# Patient Record
Sex: FEMALE | Race: WHITE | NOT HISPANIC OR LATINO | Employment: OTHER | URBAN - METROPOLITAN AREA
[De-identification: names, ages, dates, MRNs, and addresses within clinical notes are randomized per-mention and may not be internally consistent; named-entity substitution may affect disease eponyms.]

---

## 2023-03-08 PROBLEM — M65.331 TRIGGER MIDDLE FINGER OF RIGHT HAND: Status: ACTIVE | Noted: 2023-03-08

## 2023-03-08 PROBLEM — R55 VASOVAGAL SYNCOPE: Status: ACTIVE | Noted: 2023-03-08

## 2023-03-08 PROBLEM — I20.9 ANGINA PECTORIS (CMS-HCC): Status: ACTIVE | Noted: 2023-03-08

## 2023-03-08 PROBLEM — R05.9 COUGH: Status: ACTIVE | Noted: 2023-03-08

## 2023-03-08 PROBLEM — R42 DIZZINESS: Status: ACTIVE | Noted: 2023-03-08

## 2023-03-08 PROBLEM — I87.2 VENOUS INSUFFICIENCY OF BOTH LOWER EXTREMITIES: Status: ACTIVE | Noted: 2023-03-08

## 2023-03-08 PROBLEM — R26.9 ABNORMAL GAIT: Status: ACTIVE | Noted: 2023-03-08

## 2023-03-08 PROBLEM — E78.5 HYPERLIPIDEMIA: Status: ACTIVE | Noted: 2023-03-08

## 2023-03-08 PROBLEM — K21.9 GASTROESOPHAGEAL REFLUX DISEASE: Status: ACTIVE | Noted: 2023-03-08

## 2023-03-08 PROBLEM — S63.509A WRIST SPRAIN: Status: ACTIVE | Noted: 2023-03-08

## 2023-03-08 PROBLEM — M85.80 OSTEOPENIA: Status: ACTIVE | Noted: 2023-03-08

## 2023-03-08 PROBLEM — K59.00 CONSTIPATION: Status: ACTIVE | Noted: 2023-03-08

## 2023-03-08 PROBLEM — S76.211D: Status: ACTIVE | Noted: 2023-03-08

## 2023-03-08 PROBLEM — R00.1 SINUS BRADYCARDIA: Status: ACTIVE | Noted: 2023-03-08

## 2023-03-08 PROBLEM — R25.2 SPASM: Status: ACTIVE | Noted: 2023-03-08

## 2023-03-08 PROBLEM — I10 BENIGN ESSENTIAL HYPERTENSION: Status: ACTIVE | Noted: 2023-03-08

## 2023-03-08 PROBLEM — F41.0 PANIC DISORDER WITHOUT AGORAPHOBIA: Status: ACTIVE | Noted: 2023-03-08

## 2023-03-08 PROBLEM — F41.8 ANTICIPATORY ANXIETY: Status: ACTIVE | Noted: 2023-03-08

## 2023-03-08 PROBLEM — R49.0 HOARSENESS OF VOICE: Status: ACTIVE | Noted: 2023-03-08

## 2023-03-08 PROBLEM — M19.041 OSTEOARTHRITIS OF FINGER, RIGHT: Status: ACTIVE | Noted: 2023-03-08

## 2023-03-08 RX ORDER — ESCITALOPRAM OXALATE 10 MG/1
1 TABLET ORAL DAILY
COMMUNITY
Start: 2017-09-26

## 2023-03-08 RX ORDER — ACETAMINOPHEN 500 MG
1 TABLET ORAL DAILY
COMMUNITY
Start: 2016-05-27

## 2023-03-08 RX ORDER — ASPIRIN 81 MG/1
1 TABLET ORAL DAILY
COMMUNITY
Start: 2017-05-30 | End: 2023-03-09 | Stop reason: ALTCHOICE

## 2023-03-08 RX ORDER — ATORVASTATIN CALCIUM 20 MG/1
TABLET, FILM COATED ORAL
COMMUNITY
End: 2023-09-20 | Stop reason: SDUPTHER

## 2023-03-08 RX ORDER — AMLODIPINE BESYLATE 10 MG/1
1 TABLET ORAL DAILY
COMMUNITY
Start: 2014-11-11 | End: 2023-05-23 | Stop reason: SDUPTHER

## 2023-03-08 RX ORDER — TRIAMCINOLONE ACETONIDE 1 MG/G
CREAM TOPICAL
COMMUNITY
Start: 2016-06-24 | End: 2024-01-08 | Stop reason: ALTCHOICE

## 2023-03-08 RX ORDER — PNV NO.95/FERROUS FUM/FOLIC AC 28MG-0.8MG
TABLET ORAL
COMMUNITY

## 2023-03-09 ENCOUNTER — OFFICE VISIT (OUTPATIENT)
Dept: PRIMARY CARE | Facility: CLINIC | Age: 78
End: 2023-03-09
Payer: MEDICARE

## 2023-03-09 VITALS
HEART RATE: 73 BPM | BODY MASS INDEX: 21.34 KG/M2 | DIASTOLIC BLOOD PRESSURE: 80 MMHG | SYSTOLIC BLOOD PRESSURE: 126 MMHG | WEIGHT: 113 LBS | OXYGEN SATURATION: 98 % | HEIGHT: 61 IN

## 2023-03-09 DIAGNOSIS — I10 BENIGN ESSENTIAL HYPERTENSION: Primary | ICD-10-CM

## 2023-03-09 DIAGNOSIS — E04.1 THYROID NODULE: ICD-10-CM

## 2023-03-09 DIAGNOSIS — M79.2 NEURALGIA: ICD-10-CM

## 2023-03-09 DIAGNOSIS — R00.2 PALPITATION: ICD-10-CM

## 2023-03-09 DIAGNOSIS — F41.8 ANTICIPATORY ANXIETY: ICD-10-CM

## 2023-03-09 PROBLEM — S76.211D: Status: RESOLVED | Noted: 2023-03-08 | Resolved: 2023-03-09

## 2023-03-09 PROBLEM — E78.5 HYPERLIPIDEMIA: Status: RESOLVED | Noted: 2023-03-08 | Resolved: 2023-03-09

## 2023-03-09 PROBLEM — K59.00 CONSTIPATION: Status: RESOLVED | Noted: 2023-03-08 | Resolved: 2023-03-09

## 2023-03-09 PROBLEM — R42 DIZZINESS: Status: RESOLVED | Noted: 2023-03-08 | Resolved: 2023-03-09

## 2023-03-09 PROBLEM — M65.331 TRIGGER MIDDLE FINGER OF RIGHT HAND: Status: RESOLVED | Noted: 2023-03-08 | Resolved: 2023-03-09

## 2023-03-09 PROBLEM — R55 VASOVAGAL SYNCOPE: Status: RESOLVED | Noted: 2023-03-08 | Resolved: 2023-03-09

## 2023-03-09 PROBLEM — R49.0 HOARSENESS OF VOICE: Status: RESOLVED | Noted: 2023-03-08 | Resolved: 2023-03-09

## 2023-03-09 PROBLEM — R26.9 ABNORMAL GAIT: Status: RESOLVED | Noted: 2023-03-08 | Resolved: 2023-03-09

## 2023-03-09 PROBLEM — I87.2 VENOUS INSUFFICIENCY OF BOTH LOWER EXTREMITIES: Status: RESOLVED | Noted: 2023-03-08 | Resolved: 2023-03-09

## 2023-03-09 PROBLEM — R25.2 SPASM: Status: RESOLVED | Noted: 2023-03-08 | Resolved: 2023-03-09

## 2023-03-09 PROBLEM — M19.041 OSTEOARTHRITIS OF FINGER, RIGHT: Status: RESOLVED | Noted: 2023-03-08 | Resolved: 2023-03-09

## 2023-03-09 PROBLEM — I20.9 ANGINA PECTORIS (CMS-HCC): Status: RESOLVED | Noted: 2023-03-08 | Resolved: 2023-03-09

## 2023-03-09 PROBLEM — R05.9 COUGH: Status: RESOLVED | Noted: 2023-03-08 | Resolved: 2023-03-09

## 2023-03-09 PROBLEM — S63.509A WRIST SPRAIN: Status: RESOLVED | Noted: 2023-03-08 | Resolved: 2023-03-09

## 2023-03-09 PROCEDURE — 99214 OFFICE O/P EST MOD 30 MIN: CPT | Performed by: INTERNAL MEDICINE

## 2023-03-09 PROCEDURE — 1036F TOBACCO NON-USER: CPT | Performed by: INTERNAL MEDICINE

## 2023-03-09 PROCEDURE — 1160F RVW MEDS BY RX/DR IN RCRD: CPT | Performed by: INTERNAL MEDICINE

## 2023-03-09 PROCEDURE — 1159F MED LIST DOCD IN RCRD: CPT | Performed by: INTERNAL MEDICINE

## 2023-03-09 PROCEDURE — 3074F SYST BP LT 130 MM HG: CPT | Performed by: INTERNAL MEDICINE

## 2023-03-09 PROCEDURE — 3079F DIAST BP 80-89 MM HG: CPT | Performed by: INTERNAL MEDICINE

## 2023-03-09 ASSESSMENT — ENCOUNTER SYMPTOMS
DIZZINESS: 0
WEAKNESS: 0
EYE ITCHING: 0
EYE DISCHARGE: 0
TREMORS: 0
FACIAL ASYMMETRY: 0
SPEECH DIFFICULTY: 0
PALPITATIONS: 1
NUMBNESS: 1
EYE REDNESS: 0

## 2023-03-09 ASSESSMENT — PAIN SCALES - GENERAL: PAINLEVEL: 0-NO PAIN

## 2023-03-09 NOTE — PROGRESS NOTES
Subjective   Patient ID: Aneta Byers is a 77 y.o. female who presents multitude of concerns  For numbness and tingling in her face now for several years but feels it is becoming more frequent seems to be more from her ear perhaps behind her ear into the right side of her face and into her chin there is no rhyme or reason why this happens but has been happening almost on a daily basis it may last about 30 minutes at a time she has not noted any motor dysfunction any issues with speech any issues with vision sometimes she feels that if she is leaning on her left elbow the right side of her face becomes numb and tingly  Is never wakes her up at nighttime but  thinks it may be happening more at nighttime when she first lays down  She recently had knee replacement now laying on her back used to lay on her stomach  She is also concerned that she has episodes where she feels that her heart is beating fast she has had some evaluation in the past and has been felt that this was more panic this does not happen very often and may be a few times a year perhaps 30 minutes at a time does not have chest pain excessive shortness of breath is never happens with exercise and she usually exercises very frequently  She is concerned about her thyroid a thyroid nodule was found incidentally when she had CT scan of her neck after a fall she is seen endocrinologist and was given the option of follow-up in 6 months or biopsy she opted for follow-up but is a little concerned  She feels her groin on the right side may be a little more swollen since she had her knee replacement now about 2 months ago  She is doing quite well with the knee replacement  It has been a difficult week for her her sister  very suddenly last week had not been well they think it was probably an MI she was 81 Aneta is concerned that she  suddenly and want to make sure there is nothing further she should do at this time they are not doing an  autopsy        Review of Systems   HENT:  Positive for hearing loss.    Eyes:  Negative for discharge, redness, itching and visual disturbance.   Cardiovascular:  Positive for palpitations and leg swelling. Negative for chest pain.   Neurological:  Positive for numbness. Negative for dizziness, tremors, facial asymmetry, speech difficulty and weakness.       Objective   Physical examination reveals a well-developed woman younger than age in no acute distress  HEENT exam is entirely unremarkable TMs are normal cranial nerves II through XII are intact without any deficits noted there is no pain to palpation  There is a very small lymph node palpable in the anterior right cervical chain thyroid is within normal limits  Cardiovascular regular rate and rhythm I detect no murmurs rubs or gallops  There are no carotid bruits noted  The lungs are clear to auscultation and percussion  Periphery right lower extremity with trace to 1+ edema  Scar well-healing from recent knee replacement  Examination of the groin unremarkable I do not detect any asymmetry  No evidence of hernia is present    Assessment/Plan   Problem List Items Addressed This Visit          Circulatory    Benign essential hypertension - Primary       Other    Anticipatory anxiety     Assessment and plan  1.  Hypertension under adequate control continue current regimen  2.  Palpitations very rare has had some work-up in the past felt to be secondary to anxiety and  does believe it is more at times of anxiety at this point we will continue to monitor if she would have more frequent episodes become more bothersome consider Zio patch  3.  Facial numbness tingling intermittent in nature no motor deficits noted and she has not noted any motor dysfunction either sounds more like a neuralgia not debilitating just somewhat bothersome at this point I do not feel we need to do anything further unless her symptoms would worsen at which point consider imaging or  neurology follow-up we discussed medications like gabapentin perhaps an occipital neuralgia as well has not had any new glasses as I thought perhaps impingement from her glasses  4.  Peripheral edema right lower extremity slightly more than left secondary to her knee replacement I do not detect any issues in her groin  5.  Thyroid nodule following with endocrinology think it is fine to do the 6-month follow-up as she is planning  6.  Anxiety she does have a psychologist she follows with she takes very rare lorazepam continue current regimen is not request refills on any of her medications today  7.  Hypercalcemia she is following also with endocrinology unfortunately I cannot access all those records planning to have bone density as well  #8 grief recent loss of sister she was 81 no major health issues did have COVID couple of weeks prior at this point I do not feel we need to do anything else in work-up for Aneta  Greater than 35 minutes was spent with this patient of which more than 50% was counseling and coordination of care

## 2023-03-15 DIAGNOSIS — Z78.0 ASYMPTOMATIC MENOPAUSAL STATE: ICD-10-CM

## 2023-03-21 ENCOUNTER — TELEPHONE (OUTPATIENT)
Dept: PRIMARY CARE | Facility: CLINIC | Age: 78
End: 2023-03-21
Payer: MEDICARE

## 2023-03-21 NOTE — TELEPHONE ENCOUNTER
----- Message from Zakiya Khan MD sent at 3/20/2023  4:12 PM EDT -----  Please let her know her bone density shows osteopenia which is thinning of the bone it does not meet criteria for any treatment other than  be sure to get adequate calcium and vitamin D in diet as well as weightbearing exercise and we will plan to recheck this in about 2 years

## 2023-04-14 DIAGNOSIS — Z00.00 ADULT GENERAL MEDICAL EXAM: ICD-10-CM

## 2023-05-02 ENCOUNTER — LAB (OUTPATIENT)
Dept: LAB | Facility: LAB | Age: 78
End: 2023-05-02
Payer: MEDICARE

## 2023-05-02 DIAGNOSIS — Z00.00 ADULT GENERAL MEDICAL EXAM: ICD-10-CM

## 2023-05-02 LAB
ALANINE AMINOTRANSFERASE (SGPT) (U/L) IN SER/PLAS: 23 U/L (ref 7–45)
ALBUMIN (G/DL) IN SER/PLAS: 4.7 G/DL (ref 3.4–5)
ALKALINE PHOSPHATASE (U/L) IN SER/PLAS: 111 U/L (ref 33–136)
ANION GAP IN SER/PLAS: 12 MMOL/L (ref 10–20)
ASPARTATE AMINOTRANSFERASE (SGOT) (U/L) IN SER/PLAS: 21 U/L (ref 9–39)
BASOPHILS (10*3/UL) IN BLOOD BY AUTOMATED COUNT: 0.02 X10E9/L (ref 0–0.1)
BASOPHILS/100 LEUKOCYTES IN BLOOD BY AUTOMATED COUNT: 0.6 % (ref 0–2)
BILIRUBIN TOTAL (MG/DL) IN SER/PLAS: 1 MG/DL (ref 0–1.2)
C REACTIVE PROTEIN (MG/L) IN SER/PLAS BY HIGH SENSIT: 0.2 MG/L
CALCIDIOL (25 OH VITAMIN D3) (NG/ML) IN SER/PLAS: 49 NG/ML
CALCIUM (MG/DL) IN SER/PLAS: 10.9 MG/DL (ref 8.6–10.6)
CARBON DIOXIDE, TOTAL (MMOL/L) IN SER/PLAS: 32 MMOL/L (ref 21–32)
CHLORIDE (MMOL/L) IN SER/PLAS: 101 MMOL/L (ref 98–107)
CHOLESTEROL (MG/DL) IN SER/PLAS: 202 MG/DL (ref 0–199)
CHOLESTEROL IN HDL (MG/DL) IN SER/PLAS: 96.3 MG/DL
CHOLESTEROL/HDL RATIO: 2.1
CREATININE (MG/DL) IN SER/PLAS: 0.64 MG/DL (ref 0.5–1.05)
EOSINOPHILS (10*3/UL) IN BLOOD BY AUTOMATED COUNT: 0.09 X10E9/L (ref 0–0.4)
EOSINOPHILS/100 LEUKOCYTES IN BLOOD BY AUTOMATED COUNT: 2.6 % (ref 0–6)
ERYTHROCYTE DISTRIBUTION WIDTH (RATIO) BY AUTOMATED COUNT: 11.6 % (ref 11.5–14.5)
ERYTHROCYTE MEAN CORPUSCULAR HEMOGLOBIN CONCENTRATION (G/DL) BY AUTOMATED: 32.9 G/DL (ref 32–36)
ERYTHROCYTE MEAN CORPUSCULAR VOLUME (FL) BY AUTOMATED COUNT: 95 FL (ref 80–100)
ERYTHROCYTES (10*6/UL) IN BLOOD BY AUTOMATED COUNT: 4.58 X10E12/L (ref 4–5.2)
ESTIMATED AVERAGE GLUCOSE FOR HBA1C: 120 MG/DL
GFR FEMALE: 90 ML/MIN/1.73M2
GLUCOSE (MG/DL) IN SER/PLAS: 104 MG/DL (ref 74–99)
HEMATOCRIT (%) IN BLOOD BY AUTOMATED COUNT: 43.5 % (ref 36–46)
HEMOGLOBIN (G/DL) IN BLOOD: 14.3 G/DL (ref 12–16)
HEMOGLOBIN A1C/HEMOGLOBIN TOTAL IN BLOOD: 5.8 %
IMMATURE GRANULOCYTES/100 LEUKOCYTES IN BLOOD BY AUTOMATED COUNT: 0 % (ref 0–0.9)
LDL: 92 MG/DL (ref 0–99)
LEUKOCYTES (10*3/UL) IN BLOOD BY AUTOMATED COUNT: 3.5 X10E9/L (ref 4.4–11.3)
LYMPHOCYTES (10*3/UL) IN BLOOD BY AUTOMATED COUNT: 1.2 X10E9/L (ref 0.8–3)
LYMPHOCYTES/100 LEUKOCYTES IN BLOOD BY AUTOMATED COUNT: 34.6 % (ref 13–44)
MONOCYTES (10*3/UL) IN BLOOD BY AUTOMATED COUNT: 0.37 X10E9/L (ref 0.05–0.8)
MONOCYTES/100 LEUKOCYTES IN BLOOD BY AUTOMATED COUNT: 10.7 % (ref 2–10)
MUCUS, URINE: NORMAL /LPF
NEUTROPHILS (10*3/UL) IN BLOOD BY AUTOMATED COUNT: 1.79 X10E9/L (ref 1.6–5.5)
NEUTROPHILS/100 LEUKOCYTES IN BLOOD BY AUTOMATED COUNT: 51.5 % (ref 40–80)
NRBC (PER 100 WBCS) BY AUTOMATED COUNT: 0 /100 WBC (ref 0–0)
PLATELETS (10*3/UL) IN BLOOD AUTOMATED COUNT: 166 X10E9/L (ref 150–450)
POTASSIUM (MMOL/L) IN SER/PLAS: 4.4 MMOL/L (ref 3.5–5.3)
PROTEIN TOTAL: 7.2 G/DL (ref 6.4–8.2)
RBC, URINE: <1 /HPF (ref 0–5)
SODIUM (MMOL/L) IN SER/PLAS: 141 MMOL/L (ref 136–145)
SQUAMOUS EPITHELIAL CELLS, URINE: <1 /HPF
THYROTROPIN (MIU/L) IN SER/PLAS BY DETECTION LIMIT <= 0.05 MIU/L: 2.87 MIU/L (ref 0.44–3.98)
TRIGLYCERIDE (MG/DL) IN SER/PLAS: 68 MG/DL (ref 0–149)
UREA NITROGEN (MG/DL) IN SER/PLAS: 19 MG/DL (ref 6–23)
VLDL: 14 MG/DL (ref 0–40)
WBC, URINE: 1 /HPF (ref 0–5)

## 2023-05-02 PROCEDURE — 83036 HEMOGLOBIN GLYCOSYLATED A1C: CPT

## 2023-05-02 PROCEDURE — 36415 COLL VENOUS BLD VENIPUNCTURE: CPT

## 2023-05-02 PROCEDURE — 86141 C-REACTIVE PROTEIN HS: CPT

## 2023-05-02 PROCEDURE — 80053 COMPREHEN METABOLIC PANEL: CPT

## 2023-05-02 PROCEDURE — 81001 URINALYSIS AUTO W/SCOPE: CPT

## 2023-05-02 PROCEDURE — 85025 COMPLETE CBC W/AUTO DIFF WBC: CPT

## 2023-05-02 PROCEDURE — 80061 LIPID PANEL: CPT

## 2023-05-02 PROCEDURE — 82306 VITAMIN D 25 HYDROXY: CPT

## 2023-05-02 PROCEDURE — 84443 ASSAY THYROID STIM HORMONE: CPT

## 2023-05-03 PROBLEM — E04.2 MULTIPLE THYROID NODULES: Status: ACTIVE | Noted: 2022-12-01

## 2023-05-03 PROBLEM — F41.0 GENERALIZED ANXIETY DISORDER WITH PANIC ATTACKS: Status: ACTIVE | Noted: 2018-02-02

## 2023-05-03 PROBLEM — I10 ESSENTIAL HYPERTENSION, BENIGN: Status: ACTIVE | Noted: 2023-05-03

## 2023-05-03 PROBLEM — K59.09 CHRONIC CONSTIPATION: Status: ACTIVE | Noted: 2018-02-02

## 2023-05-03 PROBLEM — E83.52 HYPERCALCEMIA: Status: ACTIVE | Noted: 2023-02-10

## 2023-05-03 PROBLEM — F41.1 GENERALIZED ANXIETY DISORDER WITH PANIC ATTACKS: Status: ACTIVE | Noted: 2018-02-02

## 2023-05-03 NOTE — PROGRESS NOTES
Physical Exam    Name Aneta Byers    Date of Service :2023      Aneta Byers  77 y.o. is here for an annual physical exam  Past medical history significant for  Hypertension  Hypercholesteremia  Anxiety with some panic though currently well controlled with Lexapro and very rare lorazepam  DJD she had knee replacement in January and really has done quite well  She did have a fall prior to this while playing tennis ended up in the emergency room CT of head and neck incidental finding of a thyroid nodule and has seen Dr. Amos  and plan is for surveillance be just about due for this  Incidental finding of hypercalcemia and I believe endocrinology is also following with additional blood work including parathyroid hormone vitamin D bone density which looked pretty normal.  Last colonoscopy 2018 showing serrated adenoma recommendation for follow-up 5 years which would be now plan is to wait as just had knee replacement in January  She has had chronic issues with constipation but feels that it is even a little worse than it had been when she takes MiraLAX she sometimes feels poorly  She feels the fiber content in her diet is actually pretty good.  She occasionally has these issues where she feels very lightheaded feels that her heart is racing it may last for a while she cannot figure out any rhyme or reason  She is very concerned and that she occasionally gets a discomfort in her neck or chest but that may only last seconds was worried because her sister  very suddenly was 80 or 81 years old she is not sure if she had heart disease or not an autopsy was not performed but this makes her very anxious        Past Medical History:   Diagnosis Date    Angina pectoris, unspecified (CMS/[facility]) 2017    Angina pectoris    Constipation 2023    Decreased white blood cell count, unspecified 2015    Leukopenia    Gastro-esophageal reflux disease with esophagitis, without bleeding 2015    Reflux  esophagitis    Lesion of lateral popliteal nerve, unspecified lower limb 03/24/2015    Peroneal nerve palsy    Other specified anxiety disorders 09/26/2017    Anticipatory anxiety    Panic disorder (episodic paroxysmal anxiety) 06/27/2017    Panic disorder without agoraphobia    Personal history of (healed) traumatic fracture 03/24/2015    History of fracture of fibula    Personal history of colonic polyps 03/24/2015    History of colonic polyps    Personal history of other diseases of the digestive system 03/24/2015    History of hiatal hernia    Personal history of other specified conditions 03/24/2015    History of vertigo    Primary osteoarthritis, right hand 02/10/2017    Osteoarthritis of finger, right    Syncope and collapse 03/24/2015    Vasovagal syncope    Trigger finger, right middle finger 02/10/2017    Trigger middle finger of right hand    Unspecified sprain of unspecified wrist, initial encounter 05/27/2016    Wrist sprain    Venous insufficiency (chronic) (peripheral) 09/26/2017    Venous insufficiency of both lower extremities    Venous insufficiency of both lower extremities 03/08/2023       Past Surgical History:   Procedure Laterality Date    COLONOSCOPY  01/22/2015    Complete Colonoscopy    DILATION AND CURETTAGE OF UTERUS  12/23/2014    Dilation And Curettage    OTHER SURGICAL HISTORY  01/22/2015    Upper Gastrointestinal Endoscopy, Simple Primary Exam    OTHER SURGICAL HISTORY  03/24/2015    Ovarian Cystectomy    VAGINAL MASS EXCISION  03/24/2015    Excision Of Vaginal Cyst Or Tumor        Social History     Tobacco Use    Smoking status: Never    Smokeless tobacco: Never   Vaping Use    Vaping status: Never Used   Substance Use Topics    Alcohol use: Not Currently        Social History     Social History Narrative    She is originally from the [place] area    She is     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    Diet is healthy    Exercises routinely    Not a smoker    She has a son and a daughter with 5 grandchildren all who live in the [place] area       Family History   Problem Relation Name Age of Onset    Heart failure Mother      Leukemia Mother      Heart failure Father      Dementia Father      Breast cancer Maternal Grandmother      Heart attack Maternal Grandfather      Colon cancer Paternal Grandmother      Breast cancer Other grandmother         /72 (Patient Position: Sitting)   Ht 1.575 m (5' 2\")   Wt 50.1 kg (110 lb 6.4 oz)   BMI 20.19 kg/m²     Physical Exam  Physical examination  Reveals a well-developed woman in no acute distress    appearance is thin age-appropriate  HEENT exam  Extraocular motion is intact  Tympanic membranes and external auditory canals are normal  Oropharynx is normal  There is no cervical lymphadenopathy appreciated  The thyroid is within normal limits    Lungs    clear to auscultation and percussion    Cardiovascular   regular rate and rhythm  No murmurs rubs or gallops are appreciated    Breast examination   No dominant masses nipple discharge or axillary lymphadenopathy is appreciated    Abdomen   soft nontender bowel sounds are positive   there is no organomegaly noted        Periphery  Pulses are present without deficits noted  No peripheral edema is noted    Musculoskeletal  Gait is normal  Is no joint erythema or swelling noted  Well-healing scar on right knee from knee replacement  Range of motion is within normal limits  Strength is 5 of 5 without deficits noted    Dermatology  No concerning skin lesions are noted    Neurology  No deficits are noted  Judgment appears appropriate  Mood and affect are appropriate         Results from last 7 days   Lab Units 05/02/23  0916   WBC AUTO x10E9/L 3.5*   HEMOGLOBIN g/dL 14.3   HEMATOCRIT % 43.5   PLATELETS AUTO x10E9/L 166   NEUTROS PCT AUTO % 51.5   LYMPHS " PCT AUTO % 34.6   MONOS PCT AUTO % 10.7   EOS PCT AUTO % 2.6      Results from last 7 days   Lab Units 05/02/23  0916   HEMOGLOBIN A1C % 5.8*     Results from last 7 days   Lab Units 05/02/23  0916   SODIUM mmol/L 141   POTASSIUM mmol/L 4.4   CHLORIDE mmol/L 101   CO2 mmol/L 32   BUN mg/dL 19   CREATININE mg/dL 0.64   CALCIUM mg/dL 10.9*   PROTEIN TOTAL g/dL 7.2   BILIRUBIN TOTAL mg/dL 1.0   ALK PHOS U/L 111   ALT U/L 23   AST U/L 21   GLUCOSE mg/dL 104*      Results from last 7 days   Lab Units 05/02/23  0916   CHOLESTEROL mg/dL 202*   TRIGLYCERIDES mg/dL 68   HDL mg/dL 96.3   LDLF mg/dL 92           Results from last 7 days   Lab Units 05/02/23  0916   TSH mIU/L 2.87           No lab exists for component: UAPPEAR, UCOLOR  No components found for: UA  Lab on 05/02/2023   Component Date Value Ref Range Status    WBC 05/02/2023 3.5 (L)  4.4 - 11.3 x10E9/L Final    nRBC 05/02/2023 0.0  0.0 - 0.0 /100 WBC Final    RBC 05/02/2023 4.58  4.00 - 5.20 x10E12/L Final    Hemoglobin 05/02/2023 14.3  12.0 - 16.0 g/dL Final    Hematocrit 05/02/2023 43.5  36.0 - 46.0 % Final    MCV 05/02/2023 95  80 - 100 fL Final    MCHC 05/02/2023 32.9  32.0 - 36.0 g/dL Final    Platelets 05/02/2023 166  150 - 450 x10E9/L Final    RDW 05/02/2023 11.6  11.5 - 14.5 % Final    Neutrophils % 05/02/2023 51.5  40.0 - 80.0 % Final    Immature Granulocytes %, Automated 05/02/2023 0.0  0.0 - 0.9 % Final     Immature Granulocyte Count (IG) includes promyelocytes,    myelocytes and metamyelocytes but does not include bands.   Percent differential counts (%) should be interpreted in the   context of the absolute cell counts (cells/L).    Lymphocytes % 05/02/2023 34.6  13.0 - 44.0 % Final    Monocytes % 05/02/2023 10.7  2.0 - 10.0 % Final    Eosinophils % 05/02/2023 2.6  0.0 - 6.0 % Final    Basophils % 05/02/2023 0.6  0.0 - 2.0 % Final    Neutrophils Absolute 05/02/2023 1.79  1.60 - 5.50 x10E9/L Final    Lymphocytes Absolute 05/02/2023 1.20  0.80 - 3.00  x10E9/L Final    Monocytes Absolute 05/02/2023 0.37  0.05 - 0.80 x10E9/L Final    Eosinophils Absolute 05/02/2023 0.09  0.00 - 0.40 x10E9/L Final    Basophils Absolute 05/02/2023 0.02  0.00 - 0.10 x10E9/L Final    Glucose 05/02/2023 104 (H)  74 - 99 mg/dL Final    Sodium 05/02/2023 141  136 - 145 mmol/L Final    Potassium 05/02/2023 4.4  3.5 - 5.3 mmol/L Final    Chloride 05/02/2023 101  98 - 107 mmol/L Final    Bicarbonate 05/02/2023 32  21 - 32 mmol/L Final    Anion Gap 05/02/2023 12  10 - 20 mmol/L Final    Urea Nitrogen 05/02/2023 19  6 - 23 mg/dL Final    Creatinine 05/02/2023 0.64  0.50 - 1.05 mg/dL Final    GFR Female 05/02/2023 90  >90 mL/min/1.73m2 Final     CALCULATIONS OF ESTIMATED GFR ARE PERFORMED   USING THE 2021 CKD-EPI STUDY REFIT EQUATION   WITHOUT THE RACE VARIABLE FOR THE IDMS-TRACEABLE   CREATININE METHODS.    https://jasn.asnjournals.org/content/early/2021/09/22/ASN.2183118341    Calcium 05/02/2023 10.9 (H)  8.6 - 10.6 mg/dL Final    Albumin 05/02/2023 4.7  3.4 - 5.0 g/dL Final    Alkaline Phosphatase 05/02/2023 111  33 - 136 U/L Final    Total Protein 05/02/2023 7.2  6.4 - 8.2 g/dL Final    AST 05/02/2023 21  9 - 39 U/L Final    Total Bilirubin 05/02/2023 1.0  0.0 - 1.2 mg/dL Final    ALT (SGPT) 05/02/2023 23  7 - 45 U/L Final     Patients treated with Sulfasalazine may generate    falsely decreased results for ALT.    CRP, High Sensitivity 05/02/2023 0.2  mg/L Final        hsCRP         INTERPRETATION       mg/L  -------------------------------------------      < 1.0      LOW RELATIVE RISK OF CVD      1.0-3.0    AVERAGE RELATIVE RISK OF CVD      > 3.0      HIGH RELATIVE RISK OF CVD     Source:  ROCK TVitor A. et al. CIRCULATION 2003;  [id number]:[phone].    Hemoglobin A1C 05/02/2023 5.8 (A)  % Final         Diagnosis of Diabetes-Adults   Non-Diabetic: < or = 5.6%   Increased risk for developing diabetes: 5.7-6.4%   Diagnostic of diabetes: > or = 6.5%  .       Monitoring of Diabetes                Age  (y)     Therapeutic Goal (%)   Adults:          >18           <7.0   Pediatrics:    13-18           <7.5                   7-12           <8.0                   0- 6            7.5-8.5   American Diabetes Association. Diabetes Care 33(S1), Jan 2010.    Estimated Average Glucose 05/02/2023 120  MG/DL Final    Vitamin D, 25-Hydroxy 05/02/2023 49  ng/mL Final    .  DEFICIENCY:         < 20   NG/ML  INSUFFICIENCY:      20-29  NG/ML  SUFFICIENCY:         NG/ML    THIS ASSAY ACCURATELY QUANTIFIES THE SUM OF  VITAMIN D3, 25-HYDROXY AND VIT D2,25-HYDROXY.    WBC, Urine 05/02/2023 1  0 - 5 /HPF Final    RBC, Urine 05/02/2023 <1  0 - 5 /HPF Final    Squamous Epithelial Cells, Urine 05/02/2023 <1  /HPF Final    Mucus, Urine 05/02/2023 1+  /LPF Final    TSH 05/02/2023 2.87  0.44 - 3.98 mIU/L Final     TSH testing is performed using different testing    methodology at [facility] than at other    [facility]. Direct result comparisons should    only be made within the same method.    Cholesterol 05/02/2023 202 (H)  0 - 199 mg/dL Final    .      AGE      DESIRABLE   BORDERLINE HIGH   HIGH     0-19 Y     0 - 169       170 - 199     >/= 200    20-24 Y     0 - 189       190 - 224     >/= 225         >24 Y     0 - 199       200 - 239     >/= 240   **All ranges are based on fasting samples. Specific   therapeutic targets will vary based on patient-specific   cardiac risk.  .   Pediatric guidelines reference:Pediatrics 2011, 128(S5).   Adult guidelines reference: NCEP ATPIII Guidelines,     ROGER 2001, 258:2486-97  .   Venipuncture immediately after or during the    administration of Metamizole may lead to falsely   low results. Testing should be performed immediately   prior to Metamizole dosing.    HDL 05/02/2023 96.3  mg/dL Final    .      AGE      VERY LOW   LOW     NORMAL    HIGH       0-19 Y       < 35   < 40     40-45     ----    20-24 Y       ----   < 40       >45     ----      >24 Y       ----   < 40      40-60      >60  .    Cholesterol/HDL Ratio 05/02/2023 2.1   Final    REF VALUES  DESIRABLE  < 3.4  HIGH RISK  > 5.0    LDL 05/02/2023 92  0 - 99 mg/dL Final    .                           NEAR      BORD      AGE      DESIRABLE  OPTIMAL    HIGH     HIGH     VERY HIGH     0-19 Y     0 - 109     ---    110-129   >/= 130     ----    20-24 Y     0 - 119     ---    120-159   >/= 160     ----      >24 Y     0 -  99   100-129  130-159   160-189     >/=190  .    VLDL 05/02/2023 14  0 - 40 mg/dL Final    Triglycerides 05/02/2023 68  0 - 149 mg/dL Final    .      AGE      DESIRABLE   BORDERLINE HIGH   HIGH     VERY HIGH   0 D-90 D    19 - 174         ----         ----        ----  91 D- 9 Y     0 -  74        75 -  99     >/= 100      ----    10-19 Y     0 -  89        90 - 129     >/= 130      ----    20-24 Y     0 - 114       115 - 149     >/= 150      ----         >24 Y     0 - 149       150 - 199    200- 499    >/= 500  .   Venipuncture immediately after or during the    administration of Metamizole may lead to falsely   low results. Testing should be performed immediately   prior to Metamizole dosing.       ECG: normal sinus rhythm, no blocks or conduction defects, no ischemic changes.     Problem List Items Addressed This Visit    None  Visit Diagnoses       Palpitations    -  Primary    Relevant Orders    Holter or Event Cardiac Monitor    Hearing loss, unspecified hearing loss type, unspecified laterality        Relevant Orders    Referral to Audiology    Routine health maintenance        Relevant Orders    ECG 12 lead (Completed)            Assessment/Plan   #1 hypercholesteremia under good control with her current regimen continue current regimen  2.  Hypertension also under good control with amlodipine continue current regimen  3.  Anxiety she remains on Lexapro rarely takes lorazepam prescription usually lasts her for over a year she only has a few left she uses it more for travel she is going to be traveling in  the near future prescription will be given did not feel controlled substance contract was indicated as she uses this so infrequently  4.  Thyroid nodule following routinely with endocrine I am going to get those records as not easily available  5.  Hypercalcemia which was also found incidentally calcium remains slightly elevated I need to look at the work-up that has been done by endocrinology  6.  DJD status post knee replacement doing well  7.  Palpitations with occasional lightheadedness we will obtain a Zio patch  8.  Constipation chronic in nature but seems to have deferred somewhat MiraLAX almost seems too harsh for her   I am recommending she try fiber on a daily basis may be Benefiber she did not like how gritty the Metamucil and Citrucel were also discussed that magnesium may be helpful and recommended magnesium glycinate 200 to 400 mg at bedtime as does have some issues with insomnia at times she can then take the MiraLAX more on a as needed basis  She does have colonoscopy scheduled but not for a few months because of her recent knee replacement  9.  Health maintenance   Up-to-date with mammogram bone density  Up-to-date with immunizations except has not received bivalent  COVID-19 vaccination which I am now recommending  Continue routine regular exercise as  Congratulated her on healthy lifestyle

## 2023-05-04 ENCOUNTER — OFFICE VISIT (OUTPATIENT)
Dept: PRIMARY CARE | Facility: CLINIC | Age: 78
End: 2023-05-04
Payer: MEDICARE

## 2023-05-04 VITALS
DIASTOLIC BLOOD PRESSURE: 72 MMHG | WEIGHT: 110.4 LBS | SYSTOLIC BLOOD PRESSURE: 122 MMHG | BODY MASS INDEX: 20.32 KG/M2 | HEIGHT: 62 IN

## 2023-05-04 DIAGNOSIS — R00.2 PALPITATIONS: Primary | ICD-10-CM

## 2023-05-04 DIAGNOSIS — Z00.00 ROUTINE HEALTH MAINTENANCE: ICD-10-CM

## 2023-05-04 DIAGNOSIS — H91.90 HEARING LOSS, UNSPECIFIED HEARING LOSS TYPE, UNSPECIFIED LATERALITY: ICD-10-CM

## 2023-05-04 DIAGNOSIS — F41.0 PANIC DISORDER WITHOUT AGORAPHOBIA: ICD-10-CM

## 2023-05-04 PROCEDURE — 93000 ELECTROCARDIOGRAM COMPLETE: CPT | Performed by: INTERNAL MEDICINE

## 2023-05-04 PROCEDURE — 3074F SYST BP LT 130 MM HG: CPT | Performed by: INTERNAL MEDICINE

## 2023-05-04 PROCEDURE — 1036F TOBACCO NON-USER: CPT | Performed by: INTERNAL MEDICINE

## 2023-05-04 PROCEDURE — 3078F DIAST BP <80 MM HG: CPT | Performed by: INTERNAL MEDICINE

## 2023-05-04 PROCEDURE — UHSPHYS PR UH SELECT PHYSICAL: Performed by: INTERNAL MEDICINE

## 2023-05-04 PROCEDURE — 1159F MED LIST DOCD IN RCRD: CPT | Performed by: INTERNAL MEDICINE

## 2023-05-04 PROCEDURE — 1160F RVW MEDS BY RX/DR IN RCRD: CPT | Performed by: INTERNAL MEDICINE

## 2023-05-04 RX ORDER — LORAZEPAM 0.5 MG/1
0.5 TABLET ORAL DAILY
COMMUNITY
End: 2023-05-04 | Stop reason: SDUPTHER

## 2023-05-04 RX ORDER — LORAZEPAM 0.5 MG/1
0.5 TABLET ORAL 2 TIMES DAILY PRN
Qty: 20 TABLET | Refills: 0 | Status: SHIPPED | OUTPATIENT
Start: 2023-05-04 | End: 2024-04-05 | Stop reason: ALTCHOICE

## 2023-05-04 ASSESSMENT — PAIN SCALES - GENERAL: PAINLEVEL: 0-NO PAIN

## 2023-05-04 NOTE — PATIENT INSTRUCTIONS
It was good to see you.  You are doing a good job with your overall health care.   I know the constipation is one of your big issues  I would like you to try taking fiber on a daily basis you may want to try Benefiber which is less gritty  You could also try magnesium supplement and perhaps trying magnesium glycinate 200 to 400 mg at bedtime could  be helpful for the constipation as well as help you sleep  You can then take MiraLAX on an as-needed basis  Follow-up with colonoscopy with Dr. Lagunas as indicated.  If the constipation continues to be a big issue I am sure he would push the colonoscopy up a bit.  Otherwise I do think you are doing very well  We will continue follow-up with Dr. Amos with the thyroid nodule as well as the hypercalcemia  You are up-to-date with immunizations  I do recommend bivalent  Covid  vaccination  We will do a Zio patch for palpitations       I encourage you to stay active and healthy, and to follow these healthy habits:     Try to increase your intake of fish such as salmon and tuna and try to get 2 to 3 servings of fish per week.  Increase your intake of plant-based protein listed here:    Unprocessed nuts, walnuts, or almonds, Nuts and Seeds.      Green vegetables such as Broccoli, Peas, Greens, Spinach    Beans, Chickpeas, & Lentils    Other sources include Potatoes, Quinoa, Seaweed, Soymilk, Tempeh, and Tofu.  Increase food s higher in flavonoids found in black tea, green tea, apples, nuts, citrus fruit, berries, and dark chocolate.  You should avoid fried foods, and sugary or starchy foods and sugary drinks, and void saturated fats.    Try not to dine at restaurants more than once per month, and avoid fast food restaurants.      Try to get restful sleep approximately 7-9 hours every night.  Work towards getting 30 minutes of  moderate intensity exercise 4 to 5 days per week.  You should also try to exercise at least one hour per day with light walking.     Follow a brain  healthy lifestyle, which includes  Controlling medical conditions such as diabetes, high blood pressure, high cholesterol, thyroid disease, sleep apnea , depression and anxiety  Use eyeglasses or hearing aids appropriately if indicated  Eat a heart healthy diet such as a Mediterranean /low-fat diet with abundant fresh fruits and vegetables as well as fish  Get adequate and routine regular exercise  Maintain good sleep hygiene avoiding daytime naps and trying to get 7 to 8 hours of uninterrupted sleep at nighttime  Stay mentally active which may include puzzles, word searches, reading, computer games, playing cards or board games  Stay socially active and engaged as well

## 2023-05-22 ENCOUNTER — APPOINTMENT (OUTPATIENT)
Dept: PRIMARY CARE | Facility: CLINIC | Age: 78
End: 2023-05-22
Payer: MEDICARE

## 2023-05-23 DIAGNOSIS — I10 BENIGN ESSENTIAL HYPERTENSION: Primary | ICD-10-CM

## 2023-05-23 RX ORDER — AMLODIPINE BESYLATE 10 MG/1
10 TABLET ORAL DAILY
Qty: 90 TABLET | Refills: 3 | Status: SHIPPED | OUTPATIENT
Start: 2023-05-23 | End: 2024-05-13

## 2023-06-13 ENCOUNTER — TELEPHONE (OUTPATIENT)
Dept: PRIMARY CARE | Facility: CLINIC | Age: 78
End: 2023-06-13
Payer: MEDICARE

## 2023-06-15 ENCOUNTER — OFFICE VISIT (OUTPATIENT)
Dept: PRIMARY CARE | Facility: CLINIC | Age: 78
End: 2023-06-15
Payer: MEDICARE

## 2023-06-15 VITALS
WEIGHT: 113 LBS | HEIGHT: 62 IN | DIASTOLIC BLOOD PRESSURE: 80 MMHG | SYSTOLIC BLOOD PRESSURE: 120 MMHG | HEART RATE: 76 BPM | BODY MASS INDEX: 20.8 KG/M2 | OXYGEN SATURATION: 95 %

## 2023-06-15 DIAGNOSIS — R09.82 PND (POST-NASAL DRIP): ICD-10-CM

## 2023-06-15 DIAGNOSIS — K59.09 CHRONIC CONSTIPATION: ICD-10-CM

## 2023-06-15 DIAGNOSIS — I10 BENIGN ESSENTIAL HYPERTENSION: Primary | ICD-10-CM

## 2023-06-15 PROCEDURE — 3079F DIAST BP 80-89 MM HG: CPT | Performed by: INTERNAL MEDICINE

## 2023-06-15 PROCEDURE — 99214 OFFICE O/P EST MOD 30 MIN: CPT | Performed by: INTERNAL MEDICINE

## 2023-06-15 PROCEDURE — 1159F MED LIST DOCD IN RCRD: CPT | Performed by: INTERNAL MEDICINE

## 2023-06-15 PROCEDURE — 3074F SYST BP LT 130 MM HG: CPT | Performed by: INTERNAL MEDICINE

## 2023-06-15 PROCEDURE — 1160F RVW MEDS BY RX/DR IN RCRD: CPT | Performed by: INTERNAL MEDICINE

## 2023-06-15 PROCEDURE — 1036F TOBACCO NON-USER: CPT | Performed by: INTERNAL MEDICINE

## 2023-06-15 ASSESSMENT — PAIN SCALES - GENERAL: PAINLEVEL: 0-NO PAIN

## 2023-06-15 NOTE — PATIENT INSTRUCTIONS
I think your current symptoms are more from postnasal drip.  I would like you to try Flonase or Nasacort 2 squirts into each nostril over the next couple of weeks.  However if you continue to have issues as we discussed gastroesophageal reflux could cause some of these issues as well and would have you try omeprazole daily.  If symptoms persist could consider EGD as I know you have colonoscopy already scheduled with Dr. Lagunas  From constipation standpoint I recommend taking a fiber supplement every day like Citrucel Metamucil or Benefiber and be sure to drink plenty of fluids with this.  Take the MiraLAX more as needed if you have not had a bowel movement.  Although the MiraLAX could be taken on a daily basis if indicated

## 2023-06-15 NOTE — PROGRESS NOTES
Subjective   Patient ID: Aneta Byers is a 78 y.o. female.    HPI  For the past month feels  like her throat has been sore intermittently  feels tight at times   For about 3 weeks  Did have a tickle cough which has improved   Now throat clearing and intermittent hoarseness     Constipation which is chronic in nature but feels like it is worse than it had been she is really not doing anything routinely sometimes she will take the MiraLAX but if she takes MiraLAX routinely she then develops explosive diarrhea does have fiber supplements but not taking them routinely  She has colonoscopy for routine colonoscopy scheduled in July    Review of Systems    Objective   Physical Exam  Well-developed thin age-appropriate no acute distress  HEENT exam there is mild cobblestoning present in the posterior pharynx turbinates are mildly inflamed  There is no cervical lymphadenopathy appreciated  Lungs are clear to auscultation and percussion  Abdomen is thin soft nontender bowel sounds are positive    Assessment/Plan     #1 throat clearing intermittent sore throat as well ongoing now for 3 to 4 weeks she does feel she has more postnasal drip she has not tried anything routinely I would like her to try Flonase or Nasacort on a routine basis as it does sound more like postnasal drip to me.  We did discuss other possibilities such as gastroesophageal reflux and if the Flonase or Nasacort is not working may proceed to PPI she is very concerned.  We did discuss that if we had more concerns about reflux could consider EGD she has colonoscopy scheduled in July 2.  Chronic constipation this has been an issue for her for some time has tried MiraLAX but sometimes gets explosive diarrhea with the MiraLAX we discussed fiber supplements which she actually has at home just not taking routinely I recommend she take a fiber supplement routinely like Citrucel Metamucil or Benefiber making sure she drinks plenty of water and can do the MiraLAX  more on a as needed basis.  It is fine to do the MiraLAX daily if indicated  3.  Thyroid nodules has upcoming appointment with her endocrinologist which sounds like perhaps a thyroid biopsy again anxious regarding this and she had opted for observation I think this is fine  4.  Hypertension good control continue current regimen    30 minutes were spent with patient of which greater than 50% was spent in counseling and coordination of care  This note was partially generated using the Dragon voice recognition system.  There may be some incorrect wording ,grammar, spelling or punctuation errors that were not corrected prior to committing the note to the medical record.

## 2023-06-16 ENCOUNTER — APPOINTMENT (OUTPATIENT)
Dept: PRIMARY CARE | Facility: CLINIC | Age: 78
End: 2023-06-16
Payer: MEDICARE

## 2023-07-07 ENCOUNTER — TELEPHONE (OUTPATIENT)
Dept: PRIMARY CARE | Facility: CLINIC | Age: 78
End: 2023-07-07
Payer: MEDICARE

## 2023-07-07 NOTE — TELEPHONE ENCOUNTER
Pt is calling in regards to tongue sensitivity and tip the still red and she did go to dentist and get some mouthwash and it hasn't seemed to help _JOHANNA

## 2023-07-14 ENCOUNTER — TELEPHONE (OUTPATIENT)
Dept: PRIMARY CARE | Facility: CLINIC | Age: 78
End: 2023-07-14
Payer: MEDICARE

## 2023-07-18 DIAGNOSIS — Z12.31 ENCOUNTER FOR SCREENING MAMMOGRAM FOR MALIGNANT NEOPLASM OF BREAST: Primary | ICD-10-CM

## 2023-07-18 NOTE — TELEPHONE ENCOUNTER
She called again said she never heard back? Also she needs a mammogram req put in the system with for [facility]. Let me know hen this is done and I can call her.

## 2023-08-16 ENCOUNTER — TELEPHONE (OUTPATIENT)
Dept: PRIMARY CARE | Facility: CLINIC | Age: 78
End: 2023-08-16
Payer: MEDICARE

## 2023-08-16 DIAGNOSIS — R05.1 ACUTE COUGH: Primary | ICD-10-CM

## 2023-08-16 RX ORDER — BENZONATATE 200 MG/1
200 CAPSULE ORAL 3 TIMES DAILY PRN
Qty: 42 CAPSULE | Refills: 0 | Status: SHIPPED | OUTPATIENT
Start: 2023-08-16 | End: 2023-09-15

## 2023-08-16 NOTE — TELEPHONE ENCOUNTER
She tested positive for COVID last night. She has a temp 100.5, coughing, sneezing, achy, HA. She has been taking Tylenol but what else to do you recommend and how much and how often should she take the Tylenol?

## 2023-09-20 DIAGNOSIS — E78.49 HYPERLIPIDEMIA, FAMILIAL, HIGH LDL: Primary | ICD-10-CM

## 2023-09-20 RX ORDER — ATORVASTATIN CALCIUM 20 MG/1
20 TABLET, FILM COATED ORAL DAILY
Qty: 90 TABLET | Refills: 3 | Status: SHIPPED | OUTPATIENT
Start: 2023-09-20 | End: 2024-04-06 | Stop reason: HOSPADM

## 2023-10-04 ENCOUNTER — TELEPHONE (OUTPATIENT)
Dept: PRIMARY CARE | Facility: CLINIC | Age: 78
End: 2023-10-04
Payer: MEDICARE

## 2023-10-05 ENCOUNTER — OFFICE VISIT (OUTPATIENT)
Dept: PRIMARY CARE | Facility: CLINIC | Age: 78
End: 2023-10-05
Payer: MEDICARE

## 2023-10-05 VITALS
BODY MASS INDEX: 20.49 KG/M2 | DIASTOLIC BLOOD PRESSURE: 64 MMHG | WEIGHT: 112 LBS | TEMPERATURE: 98.4 F | SYSTOLIC BLOOD PRESSURE: 110 MMHG

## 2023-10-05 DIAGNOSIS — F41.8 ANTICIPATORY ANXIETY: ICD-10-CM

## 2023-10-05 DIAGNOSIS — F41.0 GENERALIZED ANXIETY DISORDER WITH PANIC ATTACKS: ICD-10-CM

## 2023-10-05 DIAGNOSIS — F41.9 ANXIETY: ICD-10-CM

## 2023-10-05 DIAGNOSIS — E78.49 HYPERLIPIDEMIA, FAMILIAL, HIGH LDL: ICD-10-CM

## 2023-10-05 DIAGNOSIS — R07.89 CHEST PAIN, ATYPICAL: Primary | ICD-10-CM

## 2023-10-05 DIAGNOSIS — I10 BENIGN ESSENTIAL HYPERTENSION: ICD-10-CM

## 2023-10-05 DIAGNOSIS — F41.1 GENERALIZED ANXIETY DISORDER WITH PANIC ATTACKS: ICD-10-CM

## 2023-10-05 PROCEDURE — 99214 OFFICE O/P EST MOD 30 MIN: CPT | Performed by: INTERNAL MEDICINE

## 2023-10-05 PROCEDURE — G0008 ADMIN INFLUENZA VIRUS VAC: HCPCS | Performed by: INTERNAL MEDICINE

## 2023-10-05 PROCEDURE — 90662 IIV NO PRSV INCREASED AG IM: CPT | Performed by: INTERNAL MEDICINE

## 2023-10-05 PROCEDURE — 1159F MED LIST DOCD IN RCRD: CPT | Performed by: INTERNAL MEDICINE

## 2023-10-05 PROCEDURE — 3078F DIAST BP <80 MM HG: CPT | Performed by: INTERNAL MEDICINE

## 2023-10-05 PROCEDURE — 3074F SYST BP LT 130 MM HG: CPT | Performed by: INTERNAL MEDICINE

## 2023-10-05 PROCEDURE — 1036F TOBACCO NON-USER: CPT | Performed by: INTERNAL MEDICINE

## 2023-10-05 PROCEDURE — 1160F RVW MEDS BY RX/DR IN RCRD: CPT | Performed by: INTERNAL MEDICINE

## 2023-10-05 PROCEDURE — 93000 ELECTROCARDIOGRAM COMPLETE: CPT | Performed by: INTERNAL MEDICINE

## 2023-10-05 PROCEDURE — 1126F AMNT PAIN NOTED NONE PRSNT: CPT | Performed by: INTERNAL MEDICINE

## 2023-10-05 RX ORDER — RISEDRONATE SODIUM 150 MG/1
TABLET, FILM COATED ORAL
COMMUNITY

## 2023-10-05 NOTE — PROGRESS NOTES
Sores ion her mouth, feels a strain in the back of head, sore throat, a lot of mucous, left ear feels blocked

## 2023-10-05 NOTE — PROGRESS NOTES
Subjective   Patient ID: Aneta Byers is a 78 y.o. female.    HPI  Patient presents to in follow-up of concerns of not feeling well overall.  She knows she remains anxious regarding the sudden death of her sister at age 81     Has had a lot of different issues with her tongue with a burning type tongue some canker sores has followed with her dentist routinely  She feels like her neck feels tight   Sometimes feels a strain   She has been exercising and does not experiencing the symptoms with exercise  Tightness may last 1-2 hours   She does not think it is related to eating either  She is concerned that she has thyroid nodules that are being followed by ENT as well  She does have upcoming appointment with ENT to discuss some of her ENT issues  She has known history of anxiety  Has been on Lexapro for some time she does occasionally take the lorazepam  She does have a history of hypertension  Sister may have had heart disease  Had some stress testing done in 2017        Past medical history significant for  Hypertension  Hypercholesteremia  Anxiety        Review of Systems    Objective   Physical Exam  Well up to age-appropriate and in no acute distress  HEENT exam is entirely unremarkable posterior pharynx is normal  TMs are normal  Turbinates are normal  The neck is normal there are no masses noted  I cannot palpate any thyroid nodules  The lungs are clear to auscultation and percussion  Cardiovascular regular rate and rhythm  Periphery is without edema  EKG is normal  Assessment/Plan   #1 throat tightness/atypical chest pain I do not think this represents cardiovascular symptoms however she does have risks with high cholesterol and high blood pressure though both have been well controlled question of family history as well EKG is unremarkable  We are going to obtain a stress echo she did have a stress echo done about 5 to 6 years ago  2.  Burning tongue/mouth discomfort etiology unclear I agree with having her see  ENT she has pursued her dentist as well  3.  Anxiety much of this may be anxiety related may want to increase Lexapro she has been on the same dose for a very long time  4.  Health maintenance  High-dose flu vaccine was given today  35 minutes were spent with patient of which greater than 50% was spent in counseling and coordination of care

## 2023-10-05 NOTE — PATIENT INSTRUCTIONS
It was good to see you.  You are doing a good job with your overall health care.  Your examination is unremarkable.  I think this discomfort may represent more anxiety however you do have some risks for heart disease and therefore as we discussed we will proceed with stress echo  I would like you to keep your appointment with ENT especially with the burning tongue issues and just discuss all of those issues as well  High-dose flu vaccine was given today  COVID-19 vaccination recommended 3 months after you actually had COVID

## 2023-10-17 ENCOUNTER — APPOINTMENT (OUTPATIENT)
Dept: CARDIOLOGY | Facility: CLINIC | Age: 78
End: 2023-10-17
Payer: MEDICARE

## 2023-10-19 ENCOUNTER — OFFICE VISIT (OUTPATIENT)
Dept: OTOLARYNGOLOGY | Facility: CLINIC | Age: 78
End: 2023-10-19
Payer: MEDICARE

## 2023-10-19 VITALS — BODY MASS INDEX: 20.12 KG/M2 | WEIGHT: 110 LBS

## 2023-10-19 DIAGNOSIS — K21.9 GASTROESOPHAGEAL REFLUX DISEASE, UNSPECIFIED WHETHER ESOPHAGITIS PRESENT: ICD-10-CM

## 2023-10-19 DIAGNOSIS — R09.A2 GLOBUS SENSATION: Primary | ICD-10-CM

## 2023-10-19 PROCEDURE — 1159F MED LIST DOCD IN RCRD: CPT | Performed by: GENERAL PRACTICE

## 2023-10-19 PROCEDURE — 1036F TOBACCO NON-USER: CPT | Performed by: GENERAL PRACTICE

## 2023-10-19 PROCEDURE — 1126F AMNT PAIN NOTED NONE PRSNT: CPT | Performed by: GENERAL PRACTICE

## 2023-10-19 PROCEDURE — 1160F RVW MEDS BY RX/DR IN RCRD: CPT | Performed by: GENERAL PRACTICE

## 2023-10-19 PROCEDURE — 99203 OFFICE O/P NEW LOW 30 MIN: CPT | Performed by: GENERAL PRACTICE

## 2023-10-19 RX ORDER — FAMOTIDINE 20 MG/1
20 TABLET, FILM COATED ORAL NIGHTLY
Qty: 60 TABLET | Refills: 2 | Status: SHIPPED | OUTPATIENT
Start: 2023-10-19 | End: 2024-10-18

## 2023-10-19 RX ORDER — OMEPRAZOLE 20 MG/1
20 CAPSULE, DELAYED RELEASE ORAL 2 TIMES DAILY
Qty: 60 CAPSULE | Refills: 2 | Status: SHIPPED | OUTPATIENT
Start: 2023-10-19 | End: 2024-10-18

## 2023-10-19 ASSESSMENT — PATIENT HEALTH QUESTIONNAIRE - PHQ9
SUM OF ALL RESPONSES TO PHQ9 QUESTIONS 1 AND 2: 0
1. LITTLE INTEREST OR PLEASURE IN DOING THINGS: NOT AT ALL
2. FEELING DOWN, DEPRESSED OR HOPELESS: NOT AT ALL

## 2023-10-19 NOTE — PROGRESS NOTES
Otolaryngology - Head and Neck Surgery Outpatient New Patient Visit Note         History Of Present Illness  Aneta Byers is a 78 y.o. female presenting for evaluation of recent waxing/waning oral inflammation as well as some glbous, pharyngeal mucous and mild dysphagia.     Reports recent history of several weeks of waxing/waning burning tongue sensation, transient scattered small sores on tongue and sensitivity to certain foods.  Symptoms have essentially resolved without intervention.    No noted pattern of triggers with new diet, medication or hygiene products.  No prior similar history.      Reports more longstanding history of globus, pharyngeal mucous and mild intermittent dysphagia.    Reports a history of GERD with intermittent tums use.  No prior medical management.      Denies significant rhinitis, PND.       No  , odynophagia, dysphonia, dyspnea.  No persistent oral lesions or masses.    No otalgia, aural fullness or hearing change.  No new nasal obstruction or epistaxis.  No neck masses or lumps.  No unintentional weight loss, night sweats, F/C, N/V or systemic symptoms of toxicity or malignancy.             Past Medical History  She has a past medical history of Angina pectoris, unspecified (CMS/HCC) (05/18/2017), Constipation (03/08/2023), Decreased white blood cell count, unspecified (01/22/2015), Gastro-esophageal reflux disease with esophagitis, without bleeding (03/24/2015), Lesion of lateral popliteal nerve, unspecified lower limb (03/24/2015), Other specified anxiety disorders (09/26/2017), Panic disorder (episodic paroxysmal anxiety) (06/27/2017), Personal history of (healed) traumatic fracture (03/24/2015), Personal history of colonic polyps (03/24/2015), Personal history of other diseases of the digestive system (03/24/2015), Personal history of other specified conditions (03/24/2015), Primary osteoarthritis, right hand (02/10/2017), Syncope and collapse (03/24/2015), Trigger finger, right  middle finger (02/10/2017), Unspecified sprain of unspecified wrist, initial encounter (05/27/2016), Venous insufficiency (chronic) (peripheral) (09/26/2017), and Venous insufficiency of both lower extremities (03/08/2023).    Surgical History  She has a past surgical history that includes Dilation and curettage of uterus (12/23/2014); Other surgical history (01/22/2015); Colonoscopy (01/22/2015); Other surgical history (03/24/2015); and Vaginal mass excision (03/24/2015).     Social History  She reports that she has never smoked. She has never used smokeless tobacco. She reports that she does not currently use alcohol. No history on file for drug use.    Family History  Family History   Problem Relation Name Age of Onset    Heart failure Mother      Leukemia Mother      Heart failure Father      Dementia Father      Breast cancer Maternal Grandmother      Heart attack Maternal Grandfather      Colon cancer Paternal Grandmother      Breast cancer Other grandmother         Allergies  Sulfa (sulfonamide antibiotics)    Review of Systems  ROS: Pertinent positives as noted in HPI.    - CONSTITUTIONAL: Does not report weight loss, fever or chills.    - HEENT:   Ear: Does not report tinnitus, vertigo, hearing loss, otalgia, otorrhea  Nose: Does not report congestion, rhinorrhea, epistaxis, decreased smell  Throat: Does not report pain,  , odynophagia  Larynx: Does not report hoarseness,  difficulty breathing, pain with speaking (odynophonia)  Neck: Does not report new masses, pain, swelling  Face: Does not report sinus pain, pressure, swelling, numbness, weakness     - RESPIRATORY: Does not report SOB or cough.    - CV: Does not report palpitations or chest pain.     - GI: Does not report abdominal pain, nausea, vomiting or diarrhea.    - : Does not report dysuria or urinary frequency.    - MSK: Does not report myalgia or joint pain.    - SKIN: Does not report rash or pruritus.    - NEUROLOGICAL: Does not report  headache or syncope.    - PSYCHIATRIC: Does not report recent changes in mood. Does not report anxiety or depression.         Physical Exam:     GENERAL:   Alert & Oriented to person, place and time; Normal affect and appearance. Well developed and well nourished. Conversant & cooperative with examination.     HEAD:   Normocephalic, atraumatic. No sinus tenderness to palpation. Normal parotid bilaterally. Normal facial strength.     NEUROLOGIC:   Cranial nerves II-XII grossly intact, gait WNL. Normal mood and affect.    EYES:   Extraocular movements intact. Pupils equal, round, reactive to light and accommodation. No nystagmus, no ptosis. no scleral injection.    EAR:   Normal auricle. No discomfort or TTP with manipulation.   Handheld otoscopic exam showed normal external auditory canals bilaterally. No purulence or EAC inflammation. Minimal cerumen.   Right tympanic membrane clear and mobile without evidence of perforation, retraction or middle ear effusion.   Left tympanic membrane clear and mobile without evidence of perforation, retraction or middle ear effusion.     NOSE:   No external deformity. No external nasal lesions, lacerations, or scars. Nasal tip symmetrical with normal nasal valves.   Nasal cavity with essentially midline septum, normal mucosa and turbinates. No lesions, masses, purulence or polyps.     OC/OP:   Mucous membranes moist, no masses, lesions or exudates.   Normal tongue, floor of mouth, teeth, gums, lips. Normal posterior pharyngeal wall.    Normal tonsils without erythema, exudate or obvious calculi     NECK:   No neck masses or thyroid enlargement. Trachea midline. No tenderness to palpation    LYMPHATIC:   No cervical lymphadenopathy.     RESPIRATORY:   Symmetric chest elevation & no retractions. No significant hoarseness. No increased work of breathing.    CV:   No clubbing or cyanosis. No obvious edema    Skin:   No facial rashes, vesicles or lesions.     Extremities:   No gross  abnormalities      Clinic Procedure        Information review:  External sources (notes, imaging, lab results) listed below personally reviewed to aid in medical decision making process.  -  -  -      Assessment/Plan   Problem List Items Addressed This Visit             ICD-10-CM       Gastrointestinal and Abdominal    Gastroesophageal reflux disease K21.9    Relevant Medications    omeprazole (PriLOSEC) 20 mg DR capsule    famotidine (Pepcid) 20 mg tablet     Other Visit Diagnoses         Codes    Globus sensation    -  Primary R09.A2          Recent burning tongue symptoms without noted trigger, but have since resolved.  Discussed likely triggers and care, and pt will contact [facility] if symptoms recur.    Mild pharyngeal inflammation in the setting of intermittent GERD symptoms.  Discussed possible endoscopy vs trial of reflux medications and pt prefers to trial reflux controls.      Discussed conservative management of reflux, and risks of long term anti-reflux medications.  Discussed avoidance of triggers, dietary and behavioral management strategies for reflux.  Discussed possible referral to GI for further testing and evaluation.  Will trial maximal medical therapy (combination PPI and H2 blockers) for 1-2 months and assess for symptom response.  Plan for taper of medical management to least possible to control symptoms, in favor of using dietary/behavioral controls.         Follow up:  -plan for follow up in clinic as needed and in 1-2 months    All of the above findings, impressions, treatment planning and follow up plans were discussed with the patient who indicated understanding.  the patient was instructed to contact or return to clinic sooner if symptoms/signs persist or worsen despite the above management.      Amando Curry MD  Otolaryngology - Head and Neck Surgery

## 2023-11-02 ENCOUNTER — HOSPITAL ENCOUNTER (OUTPATIENT)
Dept: CARDIOLOGY | Facility: CLINIC | Age: 78
Discharge: HOME | End: 2023-11-02
Payer: MEDICARE

## 2023-11-02 DIAGNOSIS — R07.89 CHEST PAIN, ATYPICAL: ICD-10-CM

## 2023-11-02 DIAGNOSIS — R07.9 CHEST PAIN, UNSPECIFIED: ICD-10-CM

## 2023-11-02 PROCEDURE — 93018 CV STRESS TEST I&R ONLY: CPT | Performed by: INTERNAL MEDICINE

## 2023-11-02 PROCEDURE — 93350 STRESS TTE ONLY: CPT

## 2023-11-02 PROCEDURE — 93016 CV STRESS TEST SUPVJ ONLY: CPT | Performed by: INTERNAL MEDICINE

## 2023-11-02 PROCEDURE — 93350 STRESS TTE ONLY: CPT | Performed by: INTERNAL MEDICINE

## 2023-12-07 ENCOUNTER — OFFICE VISIT (OUTPATIENT)
Dept: OTOLARYNGOLOGY | Facility: CLINIC | Age: 78
End: 2023-12-07
Payer: MEDICARE

## 2023-12-07 VITALS — WEIGHT: 110 LBS | BODY MASS INDEX: 20.12 KG/M2

## 2023-12-07 DIAGNOSIS — R09.A2 GLOBUS PHARYNGEUS: ICD-10-CM

## 2023-12-07 DIAGNOSIS — K21.9 GASTROESOPHAGEAL REFLUX DISEASE, UNSPECIFIED WHETHER ESOPHAGITIS PRESENT: Primary | ICD-10-CM

## 2023-12-07 PROCEDURE — 1159F MED LIST DOCD IN RCRD: CPT | Performed by: GENERAL PRACTICE

## 2023-12-07 PROCEDURE — 1160F RVW MEDS BY RX/DR IN RCRD: CPT | Performed by: GENERAL PRACTICE

## 2023-12-07 PROCEDURE — 1126F AMNT PAIN NOTED NONE PRSNT: CPT | Performed by: GENERAL PRACTICE

## 2023-12-07 PROCEDURE — 99213 OFFICE O/P EST LOW 20 MIN: CPT | Performed by: GENERAL PRACTICE

## 2023-12-07 PROCEDURE — 1036F TOBACCO NON-USER: CPT | Performed by: GENERAL PRACTICE

## 2023-12-07 ASSESSMENT — PATIENT HEALTH QUESTIONNAIRE - PHQ9
SUM OF ALL RESPONSES TO PHQ9 QUESTIONS 1 AND 2: 0
2. FEELING DOWN, DEPRESSED OR HOPELESS: NOT AT ALL
1. LITTLE INTEREST OR PLEASURE IN DOING THINGS: NOT AT ALL

## 2023-12-07 NOTE — PROGRESS NOTES
Otolaryngology - Head and Neck Surgery Outpatient New Patient Visit Note        Assessment/Plan   Problem List Items Addressed This Visit             ICD-10-CM       Gastrointestinal and Abdominal    Gastroesophageal reflux disease - Primary K21.9     Other Visit Diagnoses         Codes    Globus pharyngeus     R09.A2          Improved but not resolved globus and intrmittent cough.  Some recent 'thick' sensation of tongue without evidence of mucositis or movement problems.    Discussed continued dietary/behavioral controls for reflux to avoid long term medication management.  Consider trial of H2 blocker rather than PPI if uncertain if side effects causing tongue sensation.              Follow up:  -plan for follow up in clinic as needed    All of the above findings, impressions, treatment planning and follow up plans were discussed with the patient who indicated understanding.  the patient was instructed to contact or return to clinic sooner if symptoms/signs persist or worsen despite the above management.      Amando Curry MD  Otolaryngology - Head and Neck Surgery            History Of Present Illness  Aneta Byers is a 78 y.o. female presenting for follow up for trial of omeprazole for globus, cough, hoarsenss thought to be due to reflux.  Reports use of omeprazole with good effect.   Symptoms improved though not entirely resolved.  Has been making dietary changes as well.    Has noted some recent 'thick' sensation to tongue, without obvious mucous debris, swallow or speech problems.  No dyspnea.      Recall    Aneta Byers is a 78 y.o. female presenting for evaluation of recent waxing/waning oral inflammation as well as some glbous, pharyngeal mucous and mild dysphagia.      Reports recent history of several weeks of waxing/waning burning tongue sensation, transient scattered small sores on tongue and sensitivity to certain foods.  Symptoms have essentially resolved without intervention.    No noted pattern of  triggers with new diet, medication or hygiene products.  No prior similar history.       Reports more longstanding history of globus, pharyngeal mucous and mild intermittent dysphagia.    Reports a history of GERD with intermittent tums use.  No prior medical management.       Denies significant rhinitis, PND.        No  , odynophagia, dysphonia, dyspnea.  No persistent oral lesions or masses.    No otalgia, aural fullness or hearing change.  No new nasal obstruction or epistaxis.  No neck masses or lumps.  No unintentional weight loss, night sweats, F/C, N/V or systemic symptoms of toxicity or malignancy.                Past Medical History  She has a past medical history of Angina pectoris, unspecified (CMS/HCC) (05/18/2017), Constipation (03/08/2023), Decreased white blood cell count, unspecified (01/22/2015), Gastro-esophageal reflux disease with esophagitis, without bleeding (03/24/2015), Lesion of lateral popliteal nerve, unspecified lower limb (03/24/2015), Other specified anxiety disorders (09/26/2017), Panic disorder (episodic paroxysmal anxiety) (06/27/2017), Personal history of (healed) traumatic fracture (03/24/2015), Personal history of colonic polyps (03/24/2015), Personal history of other diseases of the digestive system (03/24/2015), Personal history of other specified conditions (03/24/2015), Primary osteoarthritis, right hand (02/10/2017), Syncope and collapse (03/24/2015), Trigger finger, right middle finger (02/10/2017), Unspecified sprain of unspecified wrist, initial encounter (05/27/2016), Venous insufficiency (chronic) (peripheral) (09/26/2017), and Venous insufficiency of both lower extremities (03/08/2023).    Surgical History  She has a past surgical history that includes Dilation and curettage of uterus (12/23/2014); Other surgical history (01/22/2015); Colonoscopy (01/22/2015); Other surgical history (03/24/2015); and Vaginal mass excision (03/24/2015).     Social History  She reports  that she has never smoked. She has never used smokeless tobacco. She reports that she does not currently use alcohol. No history on file for drug use.    Family History  Family History   Problem Relation Name Age of Onset    Heart failure Mother      Leukemia Mother      Heart failure Father      Dementia Father      Breast cancer Maternal Grandmother      Heart attack Maternal Grandfather      Colon cancer Paternal Grandmother      Breast cancer Other grandmother         Allergies  Sulfa (sulfonamide antibiotics)    Review of Systems  ROS: Pertinent positives as noted in HPI.    - CONSTITUTIONAL: Does not report weight loss, fever or chills.    - HEENT:   Ear: Does not report tinnitus, vertigo, hearing loss, otalgia, otorrhea  Nose: Does not report congestion, rhinorrhea, epistaxis, decreased smell  Throat: Does not report pain, dysphagia, odynophagia  Larynx: Does not report hoarseness,  difficulty breathing, pain with speaking (odynophonia)  Neck: Does not report new masses, pain, swelling  Face: Does not report sinus pain, pressure, swelling, numbness, weakness     - RESPIRATORY: Does not report SOB or cough.    - CV: Does not report palpitations or chest pain.     - GI: Does not report abdominal pain, nausea, vomiting or diarrhea.    - : Does not report dysuria or urinary frequency.    - MSK: Does not report myalgia or joint pain.    - SKIN: Does not report rash or pruritus.    - NEUROLOGICAL: Does not report headache or syncope.    - PSYCHIATRIC: Does not report recent changes in mood. Does not report anxiety or depression.         Physical Exam:     GENERAL:   Alert & Oriented to person, place and time; Normal affect and appearance. Well developed and well nourished. Conversant & cooperative with examination.     HEAD:   Normocephalic, atraumatic. No sinus tenderness to palpation. Normal parotid bilaterally. Normal facial strength.     NEUROLOGIC:   Cranial nerves II-XII grossly intact, gait WNL. Normal  mood and affect.    EYES:   Extraocular movements intact. Pupils equal, round, reactive to light and accommodation. No nystagmus, no ptosis. no scleral injection.    EAR:   Normal auricle. No discomfort or TTP with manipulation.   Handheld otoscopic exam showed normal external auditory canals bilaterally. No purulence or EAC inflammation. Minimal cerumen.   Right tympanic membrane clear and mobile without evidence of perforation, retraction or middle ear effusion.   Left tympanic membrane clear and mobile without evidence of perforation, retraction or middle ear effusion.     NOSE:   No external deformity. No external nasal lesions, lacerations, or scars. Nasal tip symmetrical with normal nasal valves.   Nasal cavity with essentially midline septum, normal mucosa and turbinates. No lesions, masses, purulence or polyps.     OC/OP:   Mucous membranes moist, no masses, lesions or exudates.   Normal tongue, floor of mouth, teeth, gums, lips. Normal posterior pharyngeal wall.    Normal tonsils without erythema, exudate or obvious calculi     NECK:   No neck masses or thyroid enlargement. Trachea midline. No tenderness to palpation    LYMPHATIC:   No cervical lymphadenopathy.     RESPIRATORY:   Symmetric chest elevation & no retractions. No significant hoarseness. No increased work of breathing.    CV:   No clubbing or cyanosis. No obvious edema    Skin:   No facial rashes, vesicles or lesions.     Extremities:   No gross abnormalities      Clinic Procedure        Information review:  External sources (notes, imaging, lab results) listed below personally reviewed to aid in medical decision making process.  -  -  -

## 2024-01-05 ENCOUNTER — TELEPHONE (OUTPATIENT)
Dept: PRIMARY CARE | Facility: CLINIC | Age: 79
End: 2024-01-05
Payer: MEDICARE

## 2024-01-05 NOTE — TELEPHONE ENCOUNTER
She is still getting this weird sensation in her face. She was talking to an ENT friend and he was saying that she should have a CT or MRI and look at the 5th cranial nerve for Trigeminal Neurologia. Would you order this or what to do?

## 2024-01-08 ENCOUNTER — OFFICE VISIT (OUTPATIENT)
Dept: PRIMARY CARE | Facility: CLINIC | Age: 79
End: 2024-01-08
Payer: MEDICARE

## 2024-01-08 VITALS — SYSTOLIC BLOOD PRESSURE: 114 MMHG | DIASTOLIC BLOOD PRESSURE: 60 MMHG

## 2024-01-08 DIAGNOSIS — K21.9 GASTROESOPHAGEAL REFLUX DISEASE WITHOUT ESOPHAGITIS: ICD-10-CM

## 2024-01-08 DIAGNOSIS — G50.0 TRIGEMINAL NEURALGIA: Primary | ICD-10-CM

## 2024-01-08 DIAGNOSIS — R73.9 HYPERGLYCEMIA: ICD-10-CM

## 2024-01-08 DIAGNOSIS — E83.52 HYPERCALCEMIA: ICD-10-CM

## 2024-01-08 DIAGNOSIS — I10 BENIGN ESSENTIAL HYPERTENSION: ICD-10-CM

## 2024-01-08 PROBLEM — E04.1 THYROID NODULE: Status: ACTIVE | Noted: 2024-01-08

## 2024-01-08 PROCEDURE — 3074F SYST BP LT 130 MM HG: CPT | Performed by: INTERNAL MEDICINE

## 2024-01-08 PROCEDURE — 1159F MED LIST DOCD IN RCRD: CPT | Performed by: INTERNAL MEDICINE

## 2024-01-08 PROCEDURE — 1160F RVW MEDS BY RX/DR IN RCRD: CPT | Performed by: INTERNAL MEDICINE

## 2024-01-08 PROCEDURE — 99214 OFFICE O/P EST MOD 30 MIN: CPT | Performed by: INTERNAL MEDICINE

## 2024-01-08 PROCEDURE — 1036F TOBACCO NON-USER: CPT | Performed by: INTERNAL MEDICINE

## 2024-01-08 PROCEDURE — 3078F DIAST BP <80 MM HG: CPT | Performed by: INTERNAL MEDICINE

## 2024-01-08 PROCEDURE — 1126F AMNT PAIN NOTED NONE PRSNT: CPT | Performed by: INTERNAL MEDICINE

## 2024-01-08 NOTE — PROGRESS NOTES
Subjective   Patient ID: Aneta Byers is a 78 y.o. female.    HPI  Presents today in follow-up and with complaints of face discomfort in but more of an annoyance numbness tingling question of neuralgia and is spoken with acquaintance who  is ENT felt perhaps she needed imaging  More of an annoyance   Has been ongoing for some time months not necessarily worse but perhaps more frequent sometimes she does not have any discomfort it seems to be more in back of her ear occasionally in front of her ear she has not noted any neurologic deficits no facial droop no difficulty with speech no actual headaches she has had this burning tongue that she has been dealing with for some time and has had very complete workup  Past medical history significant for  Hypertension  Hypercholesteremia  Anxiety  Osteoporosis  Gastroesophageal reflux  She had had multiple issues in the past with burning tongue    Review of Systems    Objective   Physical Exam  Well-developed appears younger than age no acute distress  HEENT exam is unremarkable  Cranial nerves II through XII are intact without deficits noted  Unable to reproduce any pain or discomfort with palpation  Cardiovascular regular rate and rhythm  Lungs clear to auscultation and percussion  Periphery without edema    Assessment/Plan   1 neuralgia which seems to be more of an occipital neuralgia as it seems to be more back of her ear there are no neurologic deficits noted this pain is not unbearable has been present now for the last several months though intermittently we will obtain MRI/MRA of brain to be completed however reassurance was given that I doubt we will see any structural abnormalities.  We did discuss that at this point because her symptoms are pretty mild I would not do anything further if the symptoms become more disturbing could consider medications with something like gabapentin  2.  Hypertension good control continue current regimen  3.  Hyperglycemia or slightly  elevated hemoglobin A1c on blood work in March we will recheck hemoglobin A1c  4.  Hypercalcemia with slight elevation of her calcium in March we will recheck calcium as well  5.  Thyroid nodule has follow-up with endocrinology in the very near future at this point watching  #6 health maintenance  Doing very well overall she is exercising routinely  Up-to-date with immunizations except RSV which they will plan to do before going to [place] in the next few weeks  30 minutes were spent with patient of which greater than 50% was spent in counseling and coordination of care

## 2024-01-08 NOTE — PATIENT INSTRUCTIONS
Your examination is unremarkable which is good news.  Your symptoms may represent a neuralgia trigeminal or occipital which is basically just a nerve impingement.  We will obtain MRI/MRA of brain looking for any structural abnormalities which I doubt.  I do not see any neurologic deficits  At this time assuming the imaging is normal there really is nothing to do unless her symptoms become worse at what point the treatment tends to be more seizure medication which works for nerve pain like gabapentin  We are going to check blood work as we needed before the MRI and we will check your hemoglobin A1c again as it was very slightly elevated and your calcium also

## 2024-01-09 DIAGNOSIS — M79.2 NEURALGIA: ICD-10-CM

## 2024-01-09 DIAGNOSIS — R51.9 NONINTRACTABLE HEADACHE, UNSPECIFIED CHRONICITY PATTERN, UNSPECIFIED HEADACHE TYPE: ICD-10-CM

## 2024-01-17 ENCOUNTER — TELEPHONE (OUTPATIENT)
Dept: PRIMARY CARE | Facility: CLINIC | Age: 79
End: 2024-01-17
Payer: MEDICARE

## 2024-01-19 ENCOUNTER — HOSPITAL ENCOUNTER (OUTPATIENT)
Dept: RADIOLOGY | Facility: CLINIC | Age: 79
Discharge: HOME | End: 2024-01-19
Payer: MEDICARE

## 2024-01-19 DIAGNOSIS — G50.0 TRIGEMINAL NEURALGIA: ICD-10-CM

## 2024-01-19 PROCEDURE — 70544 MR ANGIOGRAPHY HEAD W/O DYE: CPT | Mod: 59

## 2024-01-19 PROCEDURE — 70553 MRI BRAIN STEM W/O & W/DYE: CPT

## 2024-01-19 PROCEDURE — 2550000001 HC RX 255 CONTRASTS: Performed by: INTERNAL MEDICINE

## 2024-01-19 PROCEDURE — 70544 MR ANGIOGRAPHY HEAD W/O DYE: CPT | Performed by: RADIOLOGY

## 2024-01-19 PROCEDURE — 70553 MRI BRAIN STEM W/O & W/DYE: CPT | Performed by: RADIOLOGY

## 2024-01-19 PROCEDURE — A9575 INJ GADOTERATE MEGLUMI 0.1ML: HCPCS | Performed by: INTERNAL MEDICINE

## 2024-01-19 RX ORDER — GADOTERATE MEGLUMINE 376.9 MG/ML
10 INJECTION INTRAVENOUS
Status: COMPLETED | OUTPATIENT
Start: 2024-01-19 | End: 2024-01-19

## 2024-01-19 RX ADMIN — GADOTERATE MEGLUMINE 10 ML: 376.9 INJECTION INTRAVENOUS at 09:26

## 2024-01-22 ENCOUNTER — APPOINTMENT (OUTPATIENT)
Dept: RADIOLOGY | Facility: HOSPITAL | Age: 79
End: 2024-01-22
Payer: MEDICARE

## 2024-04-05 ENCOUNTER — APPOINTMENT (OUTPATIENT)
Dept: RADIOLOGY | Facility: HOSPITAL | Age: 79
End: 2024-04-05
Payer: MEDICARE

## 2024-04-05 ENCOUNTER — HOSPITAL ENCOUNTER (OUTPATIENT)
Facility: HOSPITAL | Age: 79
Setting detail: OBSERVATION
Discharge: HOME | End: 2024-04-06
Attending: EMERGENCY MEDICINE | Admitting: INTERNAL MEDICINE
Payer: MEDICARE

## 2024-04-05 ENCOUNTER — APPOINTMENT (OUTPATIENT)
Dept: CARDIOLOGY | Facility: HOSPITAL | Age: 79
End: 2024-04-05
Payer: MEDICARE

## 2024-04-05 DIAGNOSIS — G45.9 TIA (TRANSIENT ISCHEMIC ATTACK): Primary | ICD-10-CM

## 2024-04-05 DIAGNOSIS — R00.1 SINUS BRADYCARDIA: ICD-10-CM

## 2024-04-05 DIAGNOSIS — E78.2 MIXED HYPERLIPIDEMIA: ICD-10-CM

## 2024-04-05 DIAGNOSIS — G45.8 OTHER TRANSIENT CEREBRAL ISCHEMIC ATTACKS AND RELATED SYNDROMES: ICD-10-CM

## 2024-04-05 DIAGNOSIS — I10 BENIGN ESSENTIAL HYPERTENSION: ICD-10-CM

## 2024-04-05 PROBLEM — F41.8 ANTICIPATORY ANXIETY: Status: RESOLVED | Noted: 2023-03-08 | Resolved: 2024-04-05

## 2024-04-05 PROBLEM — G50.0 TRIGEMINAL NEURALGIA: Status: ACTIVE | Noted: 2024-04-05

## 2024-04-05 PROBLEM — E83.52 HYPERCALCEMIA: Status: RESOLVED | Noted: 2023-02-10 | Resolved: 2024-04-05

## 2024-04-05 LAB
ALBUMIN SERPL BCP-MCNC: 4.3 G/DL (ref 3.4–5)
ALP SERPL-CCNC: 76 U/L (ref 33–136)
ALT SERPL W P-5'-P-CCNC: 18 U/L (ref 7–45)
ANION GAP SERPL CALC-SCNC: 14 MMOL/L (ref 10–20)
APTT PPP: 28 SECONDS (ref 27–38)
AST SERPL W P-5'-P-CCNC: 35 U/L (ref 9–39)
BASOPHILS # BLD AUTO: 0.02 X10*3/UL (ref 0–0.1)
BASOPHILS NFR BLD AUTO: 0.3 %
BILIRUB SERPL-MCNC: 1.1 MG/DL (ref 0–1.2)
BNP SERPL-MCNC: 17 PG/ML (ref 0–99)
BUN SERPL-MCNC: 20 MG/DL (ref 6–23)
CALCIUM SERPL-MCNC: 8.8 MG/DL (ref 8.6–10.3)
CARDIAC TROPONIN I PNL SERPL HS: <3 NG/L (ref 0–13)
CHLORIDE SERPL-SCNC: 102 MMOL/L (ref 98–107)
CO2 SERPL-SCNC: 23 MMOL/L (ref 21–32)
CREAT SERPL-MCNC: 0.61 MG/DL (ref 0.5–1.05)
EGFRCR SERPLBLD CKD-EPI 2021: >90 ML/MIN/1.73M*2
EOSINOPHIL # BLD AUTO: 0.06 X10*3/UL (ref 0–0.4)
EOSINOPHIL NFR BLD AUTO: 1 %
ERYTHROCYTE [DISTWIDTH] IN BLOOD BY AUTOMATED COUNT: 11.6 % (ref 11.5–14.5)
GLUCOSE SERPL-MCNC: 137 MG/DL (ref 74–99)
HCT VFR BLD AUTO: 42.1 % (ref 36–46)
HGB BLD-MCNC: 14.7 G/DL (ref 12–16)
HOLD SPECIMEN: NORMAL
HOLD SPECIMEN: NORMAL
IMM GRANULOCYTES # BLD AUTO: 0.02 X10*3/UL (ref 0–0.5)
IMM GRANULOCYTES NFR BLD AUTO: 0.3 % (ref 0–0.9)
INR PPP: 0.9 (ref 0.9–1.1)
LYMPHOCYTES # BLD AUTO: 2.18 X10*3/UL (ref 0.8–3)
LYMPHOCYTES NFR BLD AUTO: 35.9 %
MCH RBC QN AUTO: 32 PG (ref 26–34)
MCHC RBC AUTO-ENTMCNC: 34.9 G/DL (ref 32–36)
MCV RBC AUTO: 92 FL (ref 80–100)
MONOCYTES # BLD AUTO: 0.58 X10*3/UL (ref 0.05–0.8)
MONOCYTES NFR BLD AUTO: 9.5 %
NEUTROPHILS # BLD AUTO: 3.22 X10*3/UL (ref 1.6–5.5)
NEUTROPHILS NFR BLD AUTO: 53 %
NRBC BLD-RTO: 0 /100 WBCS (ref 0–0)
PLATELET # BLD AUTO: 199 X10*3/UL (ref 150–450)
POTASSIUM SERPL-SCNC: 3.9 MMOL/L (ref 3.5–5.3)
POTASSIUM SERPL-SCNC: 6.3 MMOL/L (ref 3.5–5.3)
PROT SERPL-MCNC: 6.4 G/DL (ref 6.4–8.2)
PROTHROMBIN TIME: 10.6 SECONDS (ref 9.8–12.8)
RBC # BLD AUTO: 4.6 X10*6/UL (ref 4–5.2)
SODIUM SERPL-SCNC: 133 MMOL/L (ref 136–145)
WBC # BLD AUTO: 6.1 X10*3/UL (ref 4.4–11.3)

## 2024-04-05 PROCEDURE — 70498 CT ANGIOGRAPHY NECK: CPT | Performed by: RADIOLOGY

## 2024-04-05 PROCEDURE — 36415 COLL VENOUS BLD VENIPUNCTURE: CPT | Performed by: STUDENT IN AN ORGANIZED HEALTH CARE EDUCATION/TRAINING PROGRAM

## 2024-04-05 PROCEDURE — 85730 THROMBOPLASTIN TIME PARTIAL: CPT | Performed by: STUDENT IN AN ORGANIZED HEALTH CARE EDUCATION/TRAINING PROGRAM

## 2024-04-05 PROCEDURE — 70551 MRI BRAIN STEM W/O DYE: CPT

## 2024-04-05 PROCEDURE — 99285 EMERGENCY DEPT VISIT HI MDM: CPT | Mod: 25

## 2024-04-05 PROCEDURE — 70551 MRI BRAIN STEM W/O DYE: CPT | Performed by: STUDENT IN AN ORGANIZED HEALTH CARE EDUCATION/TRAINING PROGRAM

## 2024-04-05 PROCEDURE — 70450 CT HEAD/BRAIN W/O DYE: CPT

## 2024-04-05 PROCEDURE — 70498 CT ANGIOGRAPHY NECK: CPT

## 2024-04-05 PROCEDURE — G0378 HOSPITAL OBSERVATION PER HR: HCPCS

## 2024-04-05 PROCEDURE — 2550000001 HC RX 255 CONTRASTS: Performed by: EMERGENCY MEDICINE

## 2024-04-05 PROCEDURE — 85025 COMPLETE CBC W/AUTO DIFF WBC: CPT | Performed by: STUDENT IN AN ORGANIZED HEALTH CARE EDUCATION/TRAINING PROGRAM

## 2024-04-05 PROCEDURE — 80053 COMPREHEN METABOLIC PANEL: CPT | Performed by: STUDENT IN AN ORGANIZED HEALTH CARE EDUCATION/TRAINING PROGRAM

## 2024-04-05 PROCEDURE — 99222 1ST HOSP IP/OBS MODERATE 55: CPT | Performed by: NURSE PRACTITIONER

## 2024-04-05 PROCEDURE — 96372 THER/PROPH/DIAG INJ SC/IM: CPT | Performed by: NURSE PRACTITIONER

## 2024-04-05 PROCEDURE — 2500000001 HC RX 250 WO HCPCS SELF ADMINISTERED DRUGS (ALT 637 FOR MEDICARE OP): Performed by: STUDENT IN AN ORGANIZED HEALTH CARE EDUCATION/TRAINING PROGRAM

## 2024-04-05 PROCEDURE — 2500000004 HC RX 250 GENERAL PHARMACY W/ HCPCS (ALT 636 FOR OP/ED): Performed by: NURSE PRACTITIONER

## 2024-04-05 PROCEDURE — 2500000001 HC RX 250 WO HCPCS SELF ADMINISTERED DRUGS (ALT 637 FOR MEDICARE OP): Performed by: NURSE PRACTITIONER

## 2024-04-05 PROCEDURE — 84484 ASSAY OF TROPONIN QUANT: CPT | Performed by: STUDENT IN AN ORGANIZED HEALTH CARE EDUCATION/TRAINING PROGRAM

## 2024-04-05 PROCEDURE — 70496 CT ANGIOGRAPHY HEAD: CPT | Performed by: RADIOLOGY

## 2024-04-05 PROCEDURE — 84132 ASSAY OF SERUM POTASSIUM: CPT | Mod: 59 | Performed by: STUDENT IN AN ORGANIZED HEALTH CARE EDUCATION/TRAINING PROGRAM

## 2024-04-05 PROCEDURE — 93005 ELECTROCARDIOGRAM TRACING: CPT

## 2024-04-05 PROCEDURE — 83880 ASSAY OF NATRIURETIC PEPTIDE: CPT | Performed by: NURSE PRACTITIONER

## 2024-04-05 PROCEDURE — 85610 PROTHROMBIN TIME: CPT | Performed by: STUDENT IN AN ORGANIZED HEALTH CARE EDUCATION/TRAINING PROGRAM

## 2024-04-05 PROCEDURE — 70450 CT HEAD/BRAIN W/O DYE: CPT | Mod: 59

## 2024-04-05 RX ORDER — ASPIRIN 81 MG/1
81 TABLET ORAL DAILY
COMMUNITY
Start: 2017-05-30 | End: 2024-04-06 | Stop reason: HOSPADM

## 2024-04-05 RX ORDER — CLOPIDOGREL BISULFATE 300 MG/1
300 TABLET, FILM COATED ORAL ONCE
Status: COMPLETED | OUTPATIENT
Start: 2024-04-05 | End: 2024-04-05

## 2024-04-05 RX ORDER — NAPROXEN SODIUM 220 MG/1
324 TABLET, FILM COATED ORAL ONCE
Status: COMPLETED | OUTPATIENT
Start: 2024-04-05 | End: 2024-04-05

## 2024-04-05 RX ORDER — CLOPIDOGREL BISULFATE 75 MG/1
75 TABLET ORAL DAILY
Status: DISCONTINUED | OUTPATIENT
Start: 2024-04-06 | End: 2024-04-06 | Stop reason: HOSPADM

## 2024-04-05 RX ORDER — AMLODIPINE BESYLATE 10 MG/1
10 TABLET ORAL DAILY
Status: DISCONTINUED | OUTPATIENT
Start: 2024-04-05 | End: 2024-04-06 | Stop reason: HOSPADM

## 2024-04-05 RX ORDER — ESCITALOPRAM OXALATE 10 MG/1
10 TABLET ORAL DAILY
Status: DISCONTINUED | OUTPATIENT
Start: 2024-04-05 | End: 2024-04-06 | Stop reason: HOSPADM

## 2024-04-05 RX ORDER — ENOXAPARIN SODIUM 100 MG/ML
40 INJECTION SUBCUTANEOUS EVERY 24 HOURS
Status: DISCONTINUED | OUTPATIENT
Start: 2024-04-05 | End: 2024-04-06 | Stop reason: HOSPADM

## 2024-04-05 RX ORDER — ASPIRIN 81 MG/1
81 TABLET ORAL DAILY
Status: DISCONTINUED | OUTPATIENT
Start: 2024-04-05 | End: 2024-04-06 | Stop reason: HOSPADM

## 2024-04-05 RX ORDER — FAMOTIDINE 20 MG/1
20 TABLET, FILM COATED ORAL 2 TIMES DAILY PRN
Status: DISCONTINUED | OUTPATIENT
Start: 2024-04-05 | End: 2024-04-06 | Stop reason: HOSPADM

## 2024-04-05 RX ORDER — ATORVASTATIN CALCIUM 20 MG/1
20 TABLET, FILM COATED ORAL DAILY
Status: DISCONTINUED | OUTPATIENT
Start: 2024-04-05 | End: 2024-04-06

## 2024-04-05 RX ADMIN — CLOPIDOGREL BISULFATE 300 MG: 300 TABLET, FILM COATED ORAL at 15:47

## 2024-04-05 RX ADMIN — IOHEXOL 60 ML: 350 INJECTION, SOLUTION INTRAVENOUS at 13:47

## 2024-04-05 RX ADMIN — ENOXAPARIN SODIUM 40 MG: 40 INJECTION SUBCUTANEOUS at 20:53

## 2024-04-05 RX ADMIN — ASPIRIN 81 MG 324 MG: 81 TABLET ORAL at 15:47

## 2024-04-05 RX ADMIN — ESCITALOPRAM OXALATE 10 MG: 10 TABLET ORAL at 20:52

## 2024-04-05 RX ADMIN — ATORVASTATIN CALCIUM 20 MG: 20 TABLET, FILM COATED ORAL at 20:52

## 2024-04-05 SDOH — SOCIAL STABILITY: SOCIAL INSECURITY: DO YOU FEEL ANYONE HAS EXPLOITED OR TAKEN ADVANTAGE OF YOU FINANCIALLY OR OF YOUR PERSONAL PROPERTY?: NO

## 2024-04-05 SDOH — SOCIAL STABILITY: SOCIAL INSECURITY: WERE YOU ABLE TO COMPLETE ALL THE BEHAVIORAL HEALTH SCREENINGS?: NO

## 2024-04-05 SDOH — ECONOMIC STABILITY: INCOME INSECURITY: HOW HARD IS IT FOR YOU TO PAY FOR THE VERY BASICS LIKE FOOD, HOUSING, MEDICAL CARE, AND HEATING?: NOT HARD AT ALL

## 2024-04-05 SDOH — SOCIAL STABILITY: SOCIAL INSECURITY: HAS ANYONE EVER THREATENED TO HURT YOUR FAMILY OR YOUR PETS?: NO

## 2024-04-05 SDOH — SOCIAL STABILITY: SOCIAL INSECURITY: ARE YOU OR HAVE YOU BEEN THREATENED OR ABUSED PHYSICALLY, EMOTIONALLY, OR SEXUALLY BY ANYONE?: NO

## 2024-04-05 SDOH — SOCIAL STABILITY: SOCIAL INSECURITY: DOES ANYONE TRY TO KEEP YOU FROM HAVING/CONTACTING OTHER FRIENDS OR DOING THINGS OUTSIDE YOUR HOME?: NO

## 2024-04-05 SDOH — SOCIAL STABILITY: SOCIAL INSECURITY: DO YOU FEEL UNSAFE GOING BACK TO THE PLACE WHERE YOU ARE LIVING?: NO

## 2024-04-05 SDOH — SOCIAL STABILITY: SOCIAL INSECURITY: ABUSE: ADULT

## 2024-04-05 SDOH — SOCIAL STABILITY: SOCIAL INSECURITY: ARE THERE ANY APPARENT SIGNS OF INJURIES/BEHAVIORS THAT COULD BE RELATED TO ABUSE/NEGLECT?: NO

## 2024-04-05 ASSESSMENT — ABCD2 SCORE
HISTORY OF DIABETES: NO
DURATION OF SYMPTOMS: 10-59
CLINICAL FEATURES OF THE TIA: SPEECH DISTURBANCE WITHOUT WEAKNESS
BP IS HIGHER THAN 140/90 MMHG: YES
ABCD2 SCORE: 4
AGE IS GREATER THAN OR EQUAL TO 60: YES

## 2024-04-05 ASSESSMENT — COGNITIVE AND FUNCTIONAL STATUS - GENERAL
DAILY ACTIVITIY SCORE: 24
PATIENT BASELINE BEDBOUND: NO
MOBILITY SCORE: 24

## 2024-04-05 ASSESSMENT — ENCOUNTER SYMPTOMS
GASTROINTESTINAL NEGATIVE: 1
CARDIOVASCULAR NEGATIVE: 1
NUMBNESS: 1
NECK PAIN: 1
SPEECH DIFFICULTY: 1
ENDOCRINE NEGATIVE: 1
HEMATOLOGIC/LYMPHATIC NEGATIVE: 1
PSYCHIATRIC NEGATIVE: 1
ALLERGIC/IMMUNOLOGIC NEGATIVE: 1
CONSTITUTIONAL NEGATIVE: 1
FREQUENCY: 1
RESPIRATORY NEGATIVE: 1
EYES NEGATIVE: 1

## 2024-04-05 ASSESSMENT — ACTIVITIES OF DAILY LIVING (ADL)
DRESSING YOURSELF: INDEPENDENT
HEARING - LEFT EAR: FUNCTIONAL
WALKS IN HOME: INDEPENDENT
PATIENT'S MEMORY ADEQUATE TO SAFELY COMPLETE DAILY ACTIVITIES?: YES
BATHING: INDEPENDENT
ADEQUATE_TO_COMPLETE_ADL: YES
TOILETING: INDEPENDENT
JUDGMENT_ADEQUATE_SAFELY_COMPLETE_DAILY_ACTIVITIES: YES
LACK_OF_TRANSPORTATION: NO
GROOMING: INDEPENDENT
FEEDING YOURSELF: INDEPENDENT
HEARING - RIGHT EAR: FUNCTIONAL
ASSISTIVE_DEVICE: EYEGLASSES

## 2024-04-05 ASSESSMENT — COLUMBIA-SUICIDE SEVERITY RATING SCALE - C-SSRS
1. IN THE PAST MONTH, HAVE YOU WISHED YOU WERE DEAD OR WISHED YOU COULD GO TO SLEEP AND NOT WAKE UP?: NO
6. HAVE YOU EVER DONE ANYTHING, STARTED TO DO ANYTHING, OR PREPARED TO DO ANYTHING TO END YOUR LIFE?: NO
2. HAVE YOU ACTUALLY HAD ANY THOUGHTS OF KILLING YOURSELF?: NO

## 2024-04-05 ASSESSMENT — PAIN SCALES - GENERAL: PAINLEVEL_OUTOF10: 3

## 2024-04-05 ASSESSMENT — PAIN - FUNCTIONAL ASSESSMENT: PAIN_FUNCTIONAL_ASSESSMENT: 0-10

## 2024-04-05 NOTE — ED TRIAGE NOTES
PT TO ED VIA EMS WITH C/O STROKE SX. PT FAMILY CALLED EMS AT 1300 TODAY FOR PATIENT HAVING SLURRED SPEECH WHICH LASTED FOR APROX 30 MIN. UPON ARRIVAL PT SPEECH WAS RESOLVED. DENIES WEAKNESS, NUMBNESS/TINGLING/VISION CHANGES DURING TRIAGE. PT STATES LAST NIGHT SHE FELT LIKE SHE HAD TO TALK SLOWER THAN NORMAL BUT DID NOT SLUR WORDS. ENDORSES THAT HER RIGHT SIDE OF FACE HAS BEEN TINGLING FOR A COUPLE MONTHS NOW. HX HYPERTENSION AND ANXIETY. LKW UNKNOWN

## 2024-04-05 NOTE — H&P
History Of Present Illness  Aneta Byers is a 78 y.o. female presenting with slurred speech.  HPI:  Aneta Byers is a 78 y.o. female with history of hypertension, hyperlipidemia, maternal history of prior TIAs presenting to the emergency department as a stroke alert.  patient called EMS with slurred speech.  States symptoms started around 1245.  Symptoms resolved prior to arrival to the emergency department.  She states that her symptoms lasted approximately 30 minutes.  Not associated with any vision changes, weakness, numbness in the arms and legs, no associated chest pain, shortness of breath, trauma.  Has had previous workup for right neck/facial paresthesias including MRI and MRA of the head and neck in the past.    On interview in the ED multiple family members in the room. She was at home and had slurred speech when she was talking to her son on the phone. She could not pronounce her words. She denies this happened in the past.  She has a hot pack to her right neck for pain stiffness. She has seen her PCP for concerns of right face numbness and tingling and had a previous MRI. She was given asa/plavix and will be admitted for neuro evaluation.       Past Medical History  Past Medical History:   Diagnosis Date    Constipation     UZMA (generalized anxiety disorder)     GERD (gastroesophageal reflux disease)     Hiatal hernia     Panic disorder (episodic paroxysmal anxiety)     Peroneal nerve palsy     Personal history of colonic polyps     Trigger finger, right middle finger     Vasovagal syncope     Venous insufficiency of both lower extremities     Vertigo        Surgical History  Past Surgical History:   Procedure Laterality Date    COLONOSCOPY      DILATION AND CURETTAGE OF UTERUS      OVARIAN CYST REMOVAL      VAGINAL MASS EXCISION          Social History  She reports that she has never smoked. She has never used smokeless tobacco. She reports that she does not currently use alcohol. She reports that she  does not use drugs.    Family History  Family History   Problem Relation Name Age of Onset    Heart failure Mother      Leukemia Mother      Heart failure Father      Dementia Father      Breast cancer Maternal Grandmother      Heart attack Maternal Grandfather      Colon cancer Paternal Grandmother      Breast cancer Other grandmother         Allergies  Sulfa (sulfonamide antibiotics)    Review of Systems   Constitutional: Negative.    HENT: Negative.     Eyes: Negative.    Respiratory: Negative.     Cardiovascular: Negative.    Gastrointestinal: Negative.    Endocrine: Negative.    Genitourinary:  Positive for frequency.   Musculoskeletal:  Positive for neck pain.   Skin: Negative.    Allergic/Immunologic: Negative.    Neurological:  Positive for speech difficulty and numbness.   Hematological: Negative.    Psychiatric/Behavioral: Negative.          Physical Exam  Constitutional:       Appearance: Normal appearance.      Comments: NAD   HENT:      Head:      Comments: Tongue straight smile straight   Neck:      Comments: Right neck tenderness - hot pack   Cardiovascular:      Rate and Rhythm: Regular rhythm.   Pulmonary:      Effort: Pulmonary effort is normal.      Comments: Respirations easy and unlabored   Abdominal:      Palpations: Abdomen is soft.   Musculoskeletal:         General: Normal range of motion.      Cervical back: Tenderness present.   Skin:     General: Skin is warm and dry.   Neurological:      General: No focal deficit present.      Mental Status: She is alert and oriented to person, place, and time. Mental status is at baseline.          Last Recorded Vitals  Blood pressure (!) 123/97, pulse 76, temperature 36.9 °C (98.4 °F), resp. rate 18, weight 50.4 kg (111 lb 1.8 oz), SpO2 99 %.       CT brain attack angio head and neck W and WO IV contrast   Final Result   1.  No evidence for significant stenosis of the cervical vessels.   Mild-to-moderate atherosclerotic disease of the bilateral carotid    bifurcations without evidence of flow-limiting stenosis. No evidence   of aneurysm or dissection.   2.  No evidence for significant stenosis or large branch vessel   cutoffs of the intracranial vessels. Minimal atherosclerotic   calcification of bilateral cavernous portions of the internal carotid   arteries and carotid siphons. No evidence of aneurysmal dissection.   3. Incidental thyroid nodules as described above, 11 mm nodule of the   right lobe of the thyroid and 5 mm nodule in the left lobe of the   thyroid.             I personally reviewed the images/study and I agree with the findings   as stated. This study was interpreted at [place], [place].        MACRO:   Incidental Finding:  There are few small hypoattenuating nodules   measuring greater than equal to 1 cm in the thyroid gland.   (**-YCF-**)        Instructions:  Further evaluation with nonemergent thyroid ultrasound.   (Managing Incidental Thyroid Nodules Detected on Imaging: White Paper   of the ACR Incidental Thyroid Findings Committee. Zoie Cee et   al. Journal of the American College of Radiology,Volume 12, Issue 2,   143 - 150.) [email or web address]        Signed by: Bonilla Ordonez 4/5/2024 2:18 PM   Dictation workstation:   [id number]      CT brain attack head wo IV contrast   Final Result   No evidence of acute cortical infarct or intracranial hemorrhage.        No evidence of intracranial hemorrhage or displaced skull fracture.        MACRO:   Jemima Portillo discussed the significance and urgency of this   critical finding by telephone with  ROCAEL GARCIA on 4/5/2024 at 1:48   pm.  (**-RCF-**) Findings:  See findings.             Signed by: Jemima Portillo 4/5/2024 1:49 PM   Dictation workstation:   [id number]      MR brain wo IV contrast    (Results Pending)      Scheduled medications  [Held by provider] amLODIPine, 10 mg, oral, Daily  aspirin, 81 mg, oral, Daily  atorvastatin, 20 mg, oral,  Daily  [START ON 4/6/2024] clopidogrel, 75 mg, oral, Daily  enoxaparin, 40 mg, subcutaneous, q24h  escitalopram, 10 mg, oral, Daily      Continuous medications     PRN medications  PRN medications: famotidine   Assessment/Plan   Active Problems:    Benign essential hypertension    Gastroesophageal reflux disease without esophagitis    Generalized anxiety disorder with panic attacks    Mixed hyperlipidemia    Trigeminal neuralgia  Slurred speech  TIA  Resolved speech disturbances  ED CTA negative   Tele  Asa/plavix in ED and continue   Neuro consult     HX of HTN  Home norvasc     HX of HLD  Home liptor   Check lipids     HX of Anxiety  Home ativan PRN    HX of Osteoporosis    HX of Gastroesophageal reflux    DVT prophalysis       Jessie Garcia, APRN-CNP

## 2024-04-05 NOTE — ED PROVIDER NOTES
History of Present Illness   CC: Stroke     History provided by: Patient  Limitations to History: None    HPI:  Aneta Byers is a 78 y.o. female with history of hypertension, hyperlipidemia, maternal history of prior TIAs presenting to the emergency department as a stroke alert.  patient called EMS with slurred speech.  States symptoms started around 1245.  Symptoms resolved prior to arrival to the emergency department.  She states that her symptoms lasted approximately 30 minutes.  Not associated with any vision changes, weakness, numbness in the arms and legs, no associated chest pain, shortness of breath, trauma.  Has had previous workup for right neck/facial paresthesias including MRI and MRA of the head and neck in the past.    External Records Reviewed: Reviewed MRI/MRA results from January 2024    Physical Exam   Triage vitals:  T 36.9 °C (98.4 °F)  HR 87  /83  RR 18  O2 98 %      Vital signs reviewed in nursing triage note, EMR flow sheets, and at patient's bedside.   General: Awake, alert, in no acute distress  Eyes: Gaze conjugate.  No scleral icterus or injection  HENT: Normo-cephalic, atraumatic. No stridor.  CV: Regular rate, Regular rhythm. Radial pulses 2+ bilaterally  Resp: Breathing non-labored, speaking in full sentences.  Clear to auscultation bilaterally  GI: Soft, non-distended, non-tender. No rebound or guarding.  MSK/Extremities: No gross bony deformities. Moving all extremities  Skin: Warm. Appropriate color  Neuro: Awake, alert, oriented.  Speech fluent.  Face symmetric.  Moving all extremities appropriately.  No ataxia.  Please see NIH below.    NIH Stroke Scale:  Last Known Well: 1245    1a  Level of consciousness: 0=alert; keenly responsive   1b. LOC questions:  0=Performs both tasks correctly   1c. LOC commands: 0=Performs both tasks correctly   2.  Best Gaze: 0=normal   3. Visual: 0=No visual loss   4. Facial Palsy: 0=Normal symmetric movement   5a. Motor left arm: 0=No  drift, limb holds 90 (or 45) degrees for full 10 seconds   5b.  Motor right arm: 0=No drift, limb holds 90 (or 45) degrees for full 10 seconds   6a. Motor left le=No drift, limb holds 90 (or 45) degrees for full 10 seconds   6b  Motor right le=No drift, limb holds 90 (or 45) degrees for full 10 seconds   7. Limb Ataxia: 0=Absent   8.  Sensory: 0=Normal; no sensory loss   9. Best Language:  0=No aphasia, normal   10. Dysarthria: 0=Normal   11. Extinction and Inattention: 0=No abnormality     Total:   0     VAN: Negative  TIME: 13:28      ED Course & Medical Decision Making   ED Course:  ED Course as of 24   135 CT brain attack head wo IV contrast  CT head negative [SH]   1418 CBC and Auto Differential  Normal CBC [SH]   1418 ECG 12 lead  EKG obtained at 0201 demonstrate normal sinus rhythm with rate of 83, normal axis, normal intervals, no ST segment or T wave signs of ischemia. [SH]   1440 Troponin I, High Sensitivity  Trop normal [SH]   1440 Protime-INR  INR normal [SH]   1441 CT brain attack angio head and neck W and WO IV contrast  CTA negative, mild stenosis noted [SH]   1547 Comprehensive metabolic panel(!!)  CMP with mild hyperglycemia, mild hyponatremia, hyperkalemia with marked hemolysis, otherwise normal renal function, LFTs [SH]      ED Course User Index  [SH] Fito Turner MD         Diagnoses as of 24 182   TIA (transient ischemic attack)       Differential diagnoses considered include but are not limited to: Acute ischemic stroke, TIA, intracranial hemorrhage, carotid stenosis, metabolic abnormality, hypoglycemia    Social Determinants Limiting Care: None identified    MDM:  78 y.o. female presented to the emergency department with slurred speech, now resolved, lasting approximately 30 minutes.  Presentation concerning for TIA.  Patient met at the door with EMS as she arrived via prehospital stroke alert.  Exam benign at the door, repeat exam after CT  head also benign.  CT head, CTA of the head and neck ordered as part of TIA workup.  Labs obtained.  Labs and imaging reviewed as above in the ED course section.  NIH 0.  Discussed with patient's PCP.  Although she does have recent MRI of the brain, it was not stroke protocols, so she likely will need to be admitted to observation unit for MRI brain stroke protocol and TIA workup.  We will load with aspirin and Plavix.    Patient had recurrent hemolyzed potassium, remained stable.  Did have incidental finding of thyroid nodules on CTA, patient states that she is already received workup for this.  We will continue to resend her potassium.  Suspicion low for true hyper kalemia given the marked hemolysis and potassium only in the sixes as well as EKG without any changes suggestive of hyperkalemia and normal renal function.  Discussed patient with the observation unit [facility] for admission for TIA workup.  Patient was accepted there.  I did attempt to reach out to the patient's PCP, but did not hear back.  Patient will be sent to the observation unit, I discussed with the observation [facility] that my suspicion for true hyperkalemia is low and they agreed to accept the patient over there with the fourth potassium that we have sent pending, and they will follow-up on that result.  Patient and family updated at bedside.  Admitted in stable condition.    Disposition   As a result of their workup, the patient will require admission to the hospital.  The patient was informed of their diagnosis.  Patient was given the opportunity to ask questions and answered them.  Patient agreed to be admitted to the hospital.    Procedures   Procedures    Patient seen and discussed with ED attending physician.    Fede Turner MD  PGY 3 Emergency Medicine         Fito Turner MD  Resident  04/05/24 1752

## 2024-04-06 ENCOUNTER — APPOINTMENT (OUTPATIENT)
Dept: CARDIOLOGY | Facility: HOSPITAL | Age: 79
End: 2024-04-06
Payer: MEDICARE

## 2024-04-06 VITALS
DIASTOLIC BLOOD PRESSURE: 72 MMHG | OXYGEN SATURATION: 97 % | SYSTOLIC BLOOD PRESSURE: 109 MMHG | HEIGHT: 61 IN | WEIGHT: 111.11 LBS | HEART RATE: 75 BPM | TEMPERATURE: 99.1 F | RESPIRATION RATE: 18 BRPM | BODY MASS INDEX: 20.98 KG/M2

## 2024-04-06 LAB
ANION GAP SERPL CALC-SCNC: 13 MMOL/L (ref 10–20)
AORTIC VALVE PEAK VELOCITY: 1.45 M/S
ATRIAL RATE: 83 BPM
AV PEAK GRADIENT: 8.4 MMHG
AVA (PEAK VEL): 1.87 CM2
BUN SERPL-MCNC: 12 MG/DL (ref 6–23)
CALCIUM SERPL-MCNC: 9.1 MG/DL (ref 8.6–10.3)
CHLORIDE SERPL-SCNC: 104 MMOL/L (ref 98–107)
CHOLEST SERPL-MCNC: 165 MG/DL (ref 0–199)
CHOLESTEROL/HDL RATIO: 1.9
CO2 SERPL-SCNC: 27 MMOL/L (ref 21–32)
CREAT SERPL-MCNC: 0.54 MG/DL (ref 0.5–1.05)
EGFRCR SERPLBLD CKD-EPI 2021: >90 ML/MIN/1.73M*2
ERYTHROCYTE [DISTWIDTH] IN BLOOD BY AUTOMATED COUNT: 11.7 % (ref 11.5–14.5)
GLUCOSE SERPL-MCNC: 88 MG/DL (ref 74–99)
HCT VFR BLD AUTO: 41.2 % (ref 36–46)
HDLC SERPL-MCNC: 86.3 MG/DL
HGB BLD-MCNC: 14 G/DL (ref 12–16)
LDLC SERPL CALC-MCNC: 66 MG/DL
LEFT ATRIUM VOLUME AREA LENGTH INDEX BSA: 14.9 ML/M2
LEFT VENTRICLE INTERNAL DIMENSION DIASTOLE: 3.61 CM (ref 3.5–6)
LEFT VENTRICULAR OUTFLOW TRACT DIAMETER: 1.8 CM
MAGNESIUM SERPL-MCNC: 2 MG/DL (ref 1.6–2.4)
MCH RBC QN AUTO: 31.5 PG (ref 26–34)
MCHC RBC AUTO-ENTMCNC: 34 G/DL (ref 32–36)
MCV RBC AUTO: 93 FL (ref 80–100)
MITRAL VALVE E/A RATIO: 1
NON HDL CHOLESTEROL: 79 MG/DL (ref 0–149)
NRBC BLD-RTO: 0 /100 WBCS (ref 0–0)
P AXIS: 70 DEGREES
P OFFSET: 197 MS
P ONSET: 146 MS
PLATELET # BLD AUTO: 162 X10*3/UL (ref 150–450)
POTASSIUM SERPL-SCNC: 3.2 MMOL/L (ref 3.5–5.3)
PR INTERVAL: 142 MS
Q ONSET: 217 MS
QRS COUNT: 13 BEATS
QRS DURATION: 94 MS
QT INTERVAL: 384 MS
QTC CALCULATION(BAZETT): 451 MS
QTC FREDERICIA: 428 MS
R AXIS: 71 DEGREES
RBC # BLD AUTO: 4.44 X10*6/UL (ref 4–5.2)
SODIUM SERPL-SCNC: 141 MMOL/L (ref 136–145)
T AXIS: 57 DEGREES
T OFFSET: 409 MS
TRICUSPID ANNULAR PLANE SYSTOLIC EXCURSION: 1.9 CM
TRIGL SERPL-MCNC: 62 MG/DL (ref 0–149)
VENTRICULAR RATE: 83 BPM
VLDL: 12 MG/DL (ref 0–40)
WBC # BLD AUTO: 5.5 X10*3/UL (ref 4.4–11.3)

## 2024-04-06 PROCEDURE — 85027 COMPLETE CBC AUTOMATED: CPT | Performed by: NURSE PRACTITIONER

## 2024-04-06 PROCEDURE — 80048 BASIC METABOLIC PNL TOTAL CA: CPT | Performed by: NURSE PRACTITIONER

## 2024-04-06 PROCEDURE — 36415 COLL VENOUS BLD VENIPUNCTURE: CPT | Performed by: NURSE PRACTITIONER

## 2024-04-06 PROCEDURE — 93306 TTE W/DOPPLER COMPLETE: CPT

## 2024-04-06 PROCEDURE — 99232 SBSQ HOSP IP/OBS MODERATE 35: CPT | Performed by: PHYSICIAN ASSISTANT

## 2024-04-06 PROCEDURE — 99222 1ST HOSP IP/OBS MODERATE 55: CPT | Performed by: STUDENT IN AN ORGANIZED HEALTH CARE EDUCATION/TRAINING PROGRAM

## 2024-04-06 PROCEDURE — 80061 LIPID PANEL: CPT | Performed by: NURSE PRACTITIONER

## 2024-04-06 PROCEDURE — 93306 TTE W/DOPPLER COMPLETE: CPT | Performed by: STUDENT IN AN ORGANIZED HEALTH CARE EDUCATION/TRAINING PROGRAM

## 2024-04-06 PROCEDURE — G0378 HOSPITAL OBSERVATION PER HR: HCPCS

## 2024-04-06 PROCEDURE — 2500000001 HC RX 250 WO HCPCS SELF ADMINISTERED DRUGS (ALT 637 FOR MEDICARE OP): Performed by: NURSE PRACTITIONER

## 2024-04-06 PROCEDURE — 83735 ASSAY OF MAGNESIUM: CPT | Performed by: NURSE PRACTITIONER

## 2024-04-06 PROCEDURE — 2500000002 HC RX 250 W HCPCS SELF ADMINISTERED DRUGS (ALT 637 FOR MEDICARE OP, ALT 636 FOR OP/ED): Performed by: PHYSICIAN ASSISTANT

## 2024-04-06 RX ORDER — POTASSIUM CHLORIDE 20 MEQ/1
40 TABLET, EXTENDED RELEASE ORAL ONCE
Status: COMPLETED | OUTPATIENT
Start: 2024-04-06 | End: 2024-04-06

## 2024-04-06 RX ORDER — ASPIRIN 81 MG/1
81 TABLET ORAL DAILY
Qty: 30 TABLET | Refills: 0 | Status: SHIPPED | OUTPATIENT
Start: 2024-04-06 | End: 2024-05-06

## 2024-04-06 RX ORDER — ATORVASTATIN CALCIUM 40 MG/1
40 TABLET, FILM COATED ORAL DAILY
Qty: 30 TABLET | Refills: 0 | Status: SHIPPED | OUTPATIENT
Start: 2024-04-07 | End: 2024-05-07

## 2024-04-06 RX ORDER — CLOPIDOGREL BISULFATE 75 MG/1
75 TABLET ORAL DAILY
Qty: 21 TABLET | Refills: 0 | Status: SHIPPED | OUTPATIENT
Start: 2024-04-07 | End: 2024-04-28

## 2024-04-06 RX ORDER — ATORVASTATIN CALCIUM 40 MG/1
40 TABLET, FILM COATED ORAL DAILY
Status: DISCONTINUED | OUTPATIENT
Start: 2024-04-07 | End: 2024-04-06 | Stop reason: HOSPADM

## 2024-04-06 RX ADMIN — CLOPIDOGREL 75 MG: 75 TABLET ORAL at 08:32

## 2024-04-06 RX ADMIN — POTASSIUM CHLORIDE 40 MEQ: 1500 TABLET, EXTENDED RELEASE ORAL at 08:39

## 2024-04-06 RX ADMIN — ASPIRIN 81 MG: 81 TABLET, COATED ORAL at 08:32

## 2024-04-06 ASSESSMENT — PAIN - FUNCTIONAL ASSESSMENT
PAIN_FUNCTIONAL_ASSESSMENT: 0-10
PAIN_FUNCTIONAL_ASSESSMENT: 0-10

## 2024-04-06 ASSESSMENT — COGNITIVE AND FUNCTIONAL STATUS - GENERAL
MOBILITY SCORE: 24
DAILY ACTIVITIY SCORE: 24

## 2024-04-06 ASSESSMENT — PAIN SCALES - GENERAL
PAINLEVEL_OUTOF10: 0 - NO PAIN
PAINLEVEL_OUTOF10: 0 - NO PAIN
PAINLEVEL_OUTOF10: 2

## 2024-04-06 NOTE — CONSULTS
Consults    History Of Present Illness  Aneta Byers is a 78 y.o. female presenting with history of hypertension dyslipidemia maternal history of prior TIAs presenting to emergency department for stroke alert which was informed of dysarthria.  Symptoms started just after noon and it resolved prior to coming to emergency department.  She stated the symptoms lasted approximately 30 minutes no other focal neurological deficits noted.  No new sensory deficits but in past had seen her PCP for right face numbness and tingling in past and had MRI in the past.  Now from ED she was given aspirin and Plavix was admitted for neurological evaluation remained stable in-house.    Past Medical History  Past Medical History:   Diagnosis Date    Constipation     UZMA (generalized anxiety disorder)     GERD (gastroesophageal reflux disease)     Hiatal hernia     Panic disorder (episodic paroxysmal anxiety)     Peroneal nerve palsy     Personal history of colonic polyps     Trigger finger, right middle finger     Vasovagal syncope     Venous insufficiency of both lower extremities     Vertigo      Surgical History  Past Surgical History:   Procedure Laterality Date    COLONOSCOPY      DILATION AND CURETTAGE OF UTERUS      OVARIAN CYST REMOVAL      VAGINAL MASS EXCISION       Social History  Social History     Tobacco Use    Smoking status: Never    Smokeless tobacco: Never   Vaping Use    Vaping Use: Never used   Substance Use Topics    Alcohol use: Not Currently    Drug use: Never     Allergies  Sulfa (sulfonamide antibiotics)  Medications Prior to Admission   Medication Sig Dispense Refill Last Dose    aspirin 81 mg EC tablet Take 1 tablet (81 mg) by mouth once daily.   4/4/2024    amLODIPine (Norvasc) 10 mg tablet Take 1 tablet (10 mg) by mouth once daily. 90 tablet 3 4/4/2024    atorvastatin (Lipitor) 20 mg tablet Take 1 tablet (20 mg) by mouth once daily. 90 tablet 3 4/4/2024    cholecalciferol (Vitamin D-3) 50 mcg (2,000 unit)  "capsule Take 1 capsule (50 mcg) by mouth once daily.   4/4/2024    cyanocobalamin (Vitamin B-12) 100 mcg tablet Take by mouth.   4/4/2024    escitalopram (Lexapro) 10 mg tablet Take 1 tablet (10 mg) by mouth once daily.   4/4/2024    famotidine (Pepcid) 20 mg tablet Take 1 tablet (20 mg) by mouth once daily at bedtime. (Patient taking differently: Take 1 tablet (20 mg) by mouth as needed at bedtime.) 60 tablet 2 Unknown    omeprazole (PriLOSEC) 20 mg DR capsule Take 1 capsule (20 mg) by mouth 2 times a day. Do not crush or chew. (Patient taking differently: Take 1 capsule (20 mg) by mouth 2 times a day as needed. Do not crush or chew.) 60 capsule 2 Unknown    risedronate (Actonel) 150 mg tablet PLEASE SEE ATTACHED FOR DETAILED DIRECTIONS          Review of Systems reviewed and medical records.  Neurological Exam alert oriented x 4, extraocular full, saccades normal, VOR normal, pursuit normal.  Facial sensation intact face is symmetric no abnormal involuntary face movements.  Pupils reactive visual field full.  Power intact in all 4 extremities proximally and distally.  Sensations intact in all 4 extremities proximally and distally.  Sensations tested to touch tactile and vibration joint position.  No abnormal involuntary limb movements noted.  Gait deferred.  Reflexes symmetric.  Last Recorded Vitals  Blood pressure 108/68, pulse 65, temperature 36.8 °C (98.3 °F), temperature source Oral, resp. rate 17, height 1.549 m (5' 0.98\"), weight 50.4 kg (111 lb 1.8 oz), SpO2 97 %.    Relevant Results  Scheduled medications  [Held by provider] amLODIPine, 10 mg, oral, Daily  aspirin, 81 mg, oral, Daily  atorvastatin, 20 mg, oral, Daily  clopidogrel, 75 mg, oral, Daily  enoxaparin, 40 mg, subcutaneous, q24h  escitalopram, 10 mg, oral, Daily      Continuous medications     PRN medications  PRN medications: famotidine  Results for orders placed or performed during the hospital encounter of 04/05/24 (from the past 24 hour(s)) "   Red Top   Result Value Ref Range    Extra Tube Hold for add-ons.    Gray Top   Result Value Ref Range    Extra Tube Hold for add-ons.    ECG 12 lead   Result Value Ref Range    Ventricular Rate 83 BPM    Atrial Rate 83 BPM    CA Interval 142 ms    QRS Duration 94 ms    QT Interval 384 ms    QTC Calculation(Bazett) 451 ms    P Axis 70 degrees    R Axis 71 degrees    T Axis 57 degrees    QRS Count 13 beats    Q Onset 217 ms    P Onset 146 ms    P Offset 197 ms    T Offset 409 ms    QTC Fredericia 428 ms   CBC and Auto Differential   Result Value Ref Range    WBC 6.1 4.4 - 11.3 x10*3/uL    nRBC 0.0 0.0 - 0.0 /100 WBCs    RBC 4.60 4.00 - 5.20 x10*6/uL    Hemoglobin 14.7 12.0 - 16.0 g/dL    Hematocrit 42.1 36.0 - 46.0 %    MCV 92 80 - 100 fL    MCH 32.0 26.0 - 34.0 pg    MCHC 34.9 32.0 - 36.0 g/dL    RDW 11.6 11.5 - 14.5 %    Platelets 199 150 - 450 x10*3/uL    Neutrophils % 53.0 40.0 - 80.0 %    Immature Granulocytes %, Automated 0.3 0.0 - 0.9 %    Lymphocytes % 35.9 13.0 - 44.0 %    Monocytes % 9.5 2.0 - 10.0 %    Eosinophils % 1.0 0.0 - 6.0 %    Basophils % 0.3 0.0 - 2.0 %    Neutrophils Absolute 3.22 1.60 - 5.50 x10*3/uL    Immature Granulocytes Absolute, Automated 0.02 0.00 - 0.50 x10*3/uL    Lymphocytes Absolute 2.18 0.80 - 3.00 x10*3/uL    Monocytes Absolute 0.58 0.05 - 0.80 x10*3/uL    Eosinophils Absolute 0.06 0.00 - 0.40 x10*3/uL    Basophils Absolute 0.02 0.00 - 0.10 x10*3/uL   Comprehensive metabolic panel   Result Value Ref Range    Glucose 137 (H) 74 - 99 mg/dL    Sodium 133 (L) 136 - 145 mmol/L    Potassium 6.3 (HH) 3.5 - 5.3 mmol/L    Chloride 102 98 - 107 mmol/L    Bicarbonate 23 21 - 32 mmol/L    Anion Gap 14 10 - 20 mmol/L    Urea Nitrogen 20 6 - 23 mg/dL    Creatinine 0.61 0.50 - 1.05 mg/dL    eGFR >90 >60 mL/min/1.73m*2    Calcium 8.8 8.6 - 10.3 mg/dL    Albumin 4.3 3.4 - 5.0 g/dL    Alkaline Phosphatase 76 33 - 136 U/L    Total Protein 6.4 6.4 - 8.2 g/dL    AST 35 9 - 39 U/L    Bilirubin, Total  1.1 0.0 - 1.2 mg/dL    ALT 18 7 - 45 U/L   Troponin I, High Sensitivity   Result Value Ref Range    Troponin I, High Sensitivity <3 0 - 13 ng/L   Protime-INR   Result Value Ref Range    Protime 10.6 9.8 - 12.8 seconds    INR 0.9 0.9 - 1.1   APTT   Result Value Ref Range    aPTT 28 27 - 38 seconds   B-Type Natriuretic Peptide   Result Value Ref Range    BNP 17 0 - 99 pg/mL   Potassium   Result Value Ref Range    Potassium 3.9 3.5 - 5.3 mmol/L       NIH Stroke Scale  1A. Level of Consciousness: Alert, Keenly Responsive  1B. Ask Month and Age: Both Questions Right  1C. Blink Eyes & Squeeze Hands: Performs Both Tasks  2. Best Gaze: Normal  3. Visual: No Visual Loss  4. Facial Palsy: Normal Symmetrical Movements  5B. Motor - Right Arm: No Drift  6A. Motor - Left Leg: No Drift  6B. Motor - Right Leg: No Drift  7. Limb Ataxia: Absent  8. Sensory Loss: Normal  9. Best Language: No Aphasia  10. Dysarthria: Normal  11. Extinction and Inattention: No Abnormality   ABCD2 Score: 4       [place] Coma Scale  Best Eye Response: Spontaneous  Best Verbal Response: Oriented  Best Motor Response: Follows commands  [place] Coma Scale Score: 15                 I have personally reviewed the following imaging results MR brain wo IV contrast    Result Date: 4/5/2024  Interpreted By:  Dorian Giang, STUDY: MR BRAIN WO IV CONTRAST;  4/5/2024 10:57 pm   INDICATION: Signs/Symptoms:r/o CVA.   COMPARISON: Brain attack head CT and CTA head/neck 04/05/2024   ACCESSION NUMBER(S): [id number]   ORDERING CLINICIAN: JOSÉ LUIS DAVENPORT   TECHNIQUE: Multiplanar, multi-sequence images of the brain were obtained without contrast.   FINDINGS: Diffusion weighted images show no evidence of acute ischemic infarct.   Mild periventricular and subcortical hemispheric T2/FLAIR white matter hyperintensities are most compatible with chronic small vessel ischemic disease.   There is no acute intraparenchymal hemorrhage, mass, mass-effect, or an extra-axial fluid  collection. There is a crescentic T1 hyperintense signal that marginates the inner table of the calvarium bilaterally that is slightly thicker on the right than the left and which corresponds to bone marrow signal within the calvarium when correlating with same day CT head 04/05/2024 and is unchanged from prior MRI 01/19/2024.   The ventricular size and cerebral volume are age-concordant.   Normal morphology of midline structures. The craniovertebral junction is normal.   The orbits and globes are unremarkable.   The paranasal sinuses show no air-fluid levels or hemorrhage.   The mastoid air cells are clear.       No acute infarct, acute hemorrhage, or intracranial mass effect.   Mild burden of supratentorial chronic small vessel ischemic disease.   MACRO: None.   Signed by: Dorian Giang 4/5/2024 11:53 PM Dictation workstation:   [id number]    CT brain attack angio head and neck W and WO IV contrast    Result Date: 4/5/2024  Interpreted By:  Boinlla Ordonez and Ritchie Brandon STUDY: CT BRAIN ATTACK ANGIO HEAD AND NECK W AND WO IV CONTRAST;  4/5/2024 1:48 pm   INDICATION: Signs/Symptoms:TIA.   COMPARISON: Concurrent CT of the head 04/05/2024. MRI/MRA of the head  01/19/2024.   ACCESSION NUMBER(S): [id number]   ORDERING CLINICIAN: ROCAEL GARCIA   TECHNIQUE: Unenhanced CT images of the head were obtained. Subsequently,  60 mL Omnipaque 350 was administered intravenously and axial images of the head and neck were acquired.  Coronal, sagittal, and 3-D reconstructions were provided for review.   FINDINGS:     CTA HEAD FINDINGS:   The bilateral intracranial internal carotid arteries, bilateral carotid terminals, bilateral proximal anterior and middle cerebral arteries are patent. No evidence of aneurysm or dissection. There is mild-to-moderate scattered atherosclerotic calcification of the cavernous portions of the bilateral internal carotid arteries and carotid siphons.   Posterior circulation: Bilateral intracranial  vertebral arteries, vertebrobasilar junction, basilar artery and proximal posterior cerebral arteries are normal. No evidence of aneurysm or dissection.   CTA NECK FINDINGS:   Conventional three-vessel aortic arch anatomy is noted.   There is minimal atherosclerotic calcification of the aortic arch and bilateral subclavian arteries.   Right carotid vessels: The common carotid artery is normal. Minimal calcification of the carotid bifurcation. The internal carotid artery in the neck is patent without significant flow-limiting stenosis.   Left carotid vessels: The common carotid artery is normal. Mild-to-moderate atherosclerotic calcification of the carotid bifurcation. The internal carotid artery in the neck is patent without evidence of flow-limiting stenosis. No evidence of aneurysm or dissection.   Vertebral vessels:  There is a dominant left vertebral artery with hypoplastic right vertebral artery, likely congenital variant.   The visualized lung parenchyma is unremarkable. There is incidental note of bilateral hypodense thyroid nodules. There is a 11 mm nodule involving the right lobe of the thyroid (series 302, image 139). There is a 5 mm nodule in the left lobe of the thyroid (series 302, image 149).       1.  No evidence for significant stenosis of the cervical vessels. Mild-to-moderate atherosclerotic disease of the bilateral carotid bifurcations without evidence of flow-limiting stenosis. No evidence of aneurysm or dissection. 2.  No evidence for significant stenosis or large branch vessel cutoffs of the intracranial vessels. Minimal atherosclerotic calcification of bilateral cavernous portions of the internal carotid arteries and carotid siphons. No evidence of aneurysmal dissection. 3. Incidental thyroid nodules as described above, 11 mm nodule of the right lobe of the thyroid and 5 mm nodule in the left lobe of the thyroid.     I personally reviewed the images/study and I agree with the findings as  stated. This study was interpreted at [facility], [place], [place].   MACRO: Incidental Finding:  There are few small hypoattenuating nodules measuring greater than equal to 1 cm in the thyroid gland. (**-YCF-**)   Instructions:  Further evaluation with nonemergent thyroid ultrasound. (Managing Incidental Thyroid Nodules Detected on Imaging: White Paper of the ACR Incidental Thyroid Findings Committee. Zoie Cee. et al. Journal of the American College of Radiology,Volume 12, Issue 2, 143 - 150.) [email or web address]   Signed by: Bonilla Ordonez 4/5/2024 2:18 PM Dictation workstation:   [id number]    ECG 12 lead    Result Date: 4/5/2024  Normal sinus rhythm Possible Left atrial enlargement Junctional ST depression, probably normal Borderline ECG When compared with ECG of 02-NOV-2023 16:04, Vent. rate has increased BY  27 BPM QT has lengthened    CT brain attack head wo IV contrast    Result Date: 4/5/2024  Interpreted By:  Jemima Portillo, STUDY: CT BRAIN ATTACK HEAD WO IV CONTRAST;  4/5/2024 1:40 pm   INDICATION: Signs/Symptoms:Stroke Evaluation.   COMPARISON: MR brain 01/19/2024   ACCESSION NUMBER(S): [id number]   ORDERING CLINICIAN: ROCAEL GARCIA   TECHNIQUE: Noncontrast axial CT scan of head was performed. Angled reformats in brain and bone windows were generated. The images were reviewed in bone, brain, blood and soft tissue windows.   FINDINGS: CSF Spaces: The ventricles, sulci and basal cisterns are within normal limits for patient age. There is no extraaxial fluid collection.   Parenchyma: Periventricular hypodensities are comparable to findings on the patient's MRI and are consistent with chronic small-vessel ischemic changes. The grey-white differentiation is intact. There is no mass effect or midline shift.  There is no intracranial hemorrhage.   Calvarium: The calvarium is unremarkable.   Paranasal sinuses and mastoids: Visualized paranasal sinuses and mastoids are clear.        No evidence of acute cortical infarct or intracranial hemorrhage.   No evidence of intracranial hemorrhage or displaced skull fracture.   MACRO: Jemima Portillo discussed the significance and urgency of this critical finding by telephone with  ROCAEL RADHA on 4/5/2024 at 1:48 pm.  (**-RCF-**) Findings:  See findings.     Signed by: Jemima Portillo 4/5/2024 1:49 PM Dictation workstation:   [id number]  .      Assessment/Plan   Principal Problem:    TIA (transient ischemic attack)  Active Problems:    Benign essential hypertension    Gastroesophageal reflux disease without esophagitis    Generalized anxiety disorder with panic attacks    Mixed hyperlipidemia    Trigeminal neuralgia  The patient is a 78-year-old woman who is coming in.  Dysarthria which was transient and happening in setting of having vascular risk factors.  Unclear whether this could be vascular in etiology however will do aspirin 81 mg daily and Plavix 75 mg daily for 3 weeks followed by monotherapy with aspirin given her stroke risk factors.  Atorvastatin 40 mg daily.  Pending brain MRI and MRA.  Echocardiogram with bubble study.  If everything is unremarkable then discharged with outpatient follow-up.       I spent 60 minutes in the professional and overall care of this patient.      Aasef G Shaikh, MD PhD

## 2024-04-06 NOTE — NURSING NOTE
Reviewed discharge instructions with patient and . All questions answered. Reviewed medication changes with pt - pt to take ASA 81mg AND plavix for 21 days, then after 21 days to continue on with ASA only. Other med changes reviewed with patient. Briefly discussed results of pts echo and MRI. Patient encouraged to follow up with PCP and neurology outpatient. IV access removed prior to discharge, catheter intact. Patient discharging home with no further needs. Pt ambulatory and stable at time of discharge.

## 2024-04-06 NOTE — CARE PLAN
The patient's goals for the shift include      Problem: Pain - Adult  Goal: Verbalizes/displays adequate comfort level or baseline comfort level  Outcome: Progressing    Problem: Discharge Planning  Goal: Discharge to home or other facility with appropriate resources  Outcome: Progressing      The clinical goals for the shift include     Problem: Safety - Adult  Goal: Free from fall injury  Outcome: Progressing     Problem: Fall/Injury  Goal: Be free from injury by end of the shift  Outcome: Progressing

## 2024-04-06 NOTE — PROGRESS NOTES
Aneta Byers is a 78 y.o. female on day 0 of admission presenting with TIA (transient ischemic attack).    Subjective   Seen and examined, daughter at bedside  Dysarthria resolved, denies acute events overnight  Reports some right sided neck pain, right sided headache       Objective     Physical Exam  Constitutional:       General: She is not in acute distress.     Appearance: She is not toxic-appearing.   HENT:      Head: Normocephalic and atraumatic.      Right Ear: External ear normal.      Left Ear: External ear normal.      Nose: Nose normal. No congestion.      Mouth/Throat:      Mouth: Mucous membranes are moist.      Pharynx: Oropharynx is clear.   Eyes:      General:         Right eye: No discharge.         Left eye: No discharge.      Conjunctiva/sclera: Conjunctivae normal.      Pupils: Pupils are equal, round, and reactive to light.   Cardiovascular:      Rate and Rhythm: Normal rate and regular rhythm.      Heart sounds: No murmur heard.     No gallop.   Pulmonary:      Effort: Pulmonary effort is normal.      Breath sounds: No wheezing or rales.   Abdominal:      General: Abdomen is flat. Bowel sounds are normal.      Palpations: Abdomen is soft.      Tenderness: There is no abdominal tenderness. There is no guarding or rebound.   Musculoskeletal:         General: No swelling or tenderness. Normal range of motion.      Right lower leg: No edema.      Left lower leg: No edema.   Skin:     General: Skin is warm and dry.      Findings: No erythema or rash.   Neurological:      General: No focal deficit present.      Mental Status: She is alert and oriented to person, place, and time.      Cranial Nerves: No cranial nerve deficit.      Sensory: No sensory deficit.      Motor: No weakness.   Psychiatric:         Mood and Affect: Mood normal.         Thought Content: Thought content normal.         Last Recorded Vitals  Blood pressure 115/81, pulse 68, temperature 36.6 °C (97.8 °F), temperature source  "Temporal, resp. rate 18, height 1.549 m (5' 0.98\"), weight 50.4 kg (111 lb 1.8 oz), SpO2 99 %.  Intake/Output last 3 Shifts:  I/O last 3 completed shifts:  In: - (0 mL/kg)   Out: 400 (7.9 mL/kg) [Urine:400 (0.2 mL/kg/hr)]  Weight: 50.4 kg     Relevant Results              Scheduled medications  [Held by provider] amLODIPine, 10 mg, oral, Daily  aspirin, 81 mg, oral, Daily  atorvastatin, 20 mg, oral, Daily  clopidogrel, 75 mg, oral, Daily  enoxaparin, 40 mg, subcutaneous, q24h  escitalopram, 10 mg, oral, Daily  perflutren lipid microspheres, 0.5-10 mL of dilution, intravenous, Once in imaging  perflutren protein A microsphere, 0.5 mL, intravenous, Once in imaging  sulfur hexafluoride microsphr, 2 mL, intravenous, Once in imaging      Continuous medications     PRN medications  PRN medications: famotidine  Results for orders placed or performed during the hospital encounter of 04/05/24 (from the past 24 hour(s))   Red Top   Result Value Ref Range    Extra Tube Hold for add-ons.    Gray Top   Result Value Ref Range    Extra Tube Hold for add-ons.    ECG 12 lead   Result Value Ref Range    Ventricular Rate 83 BPM    Atrial Rate 83 BPM    [place] Interval 142 ms    QRS Duration 94 ms    QT Interval 384 ms    QTC Calculation(Bazett) 451 ms    P Axis 70 degrees    R Axis 71 degrees    T Axis 57 degrees    QRS Count 13 beats    Q Onset 217 ms    P Onset 146 ms    P Offset 197 ms    T Offset 409 ms    QTC Fredericia 428 ms   CBC and Auto Differential   Result Value Ref Range    WBC 6.1 4.4 - 11.3 x10*3/uL    nRBC 0.0 0.0 - 0.0 /100 WBCs    RBC 4.60 4.00 - 5.20 x10*6/uL    Hemoglobin 14.7 12.0 - 16.0 g/dL    Hematocrit 42.1 36.0 - 46.0 %    MCV 92 80 - 100 fL    MCH 32.0 26.0 - 34.0 pg    MCHC 34.9 32.0 - 36.0 g/dL    RDW 11.6 11.5 - 14.5 %    Platelets 199 150 - 450 x10*3/uL    Neutrophils % 53.0 40.0 - 80.0 %    Immature Granulocytes %, Automated 0.3 0.0 - 0.9 %    Lymphocytes % 35.9 13.0 - 44.0 %    Monocytes % 9.5 2.0 - 10.0 % "    Eosinophils % 1.0 0.0 - 6.0 %    Basophils % 0.3 0.0 - 2.0 %    Neutrophils Absolute 3.22 1.60 - 5.50 x10*3/uL    Immature Granulocytes Absolute, Automated 0.02 0.00 - 0.50 x10*3/uL    Lymphocytes Absolute 2.18 0.80 - 3.00 x10*3/uL    Monocytes Absolute 0.58 0.05 - 0.80 x10*3/uL    Eosinophils Absolute 0.06 0.00 - 0.40 x10*3/uL    Basophils Absolute 0.02 0.00 - 0.10 x10*3/uL   Comprehensive metabolic panel   Result Value Ref Range    Glucose 137 (H) 74 - 99 mg/dL    Sodium 133 (L) 136 - 145 mmol/L    Potassium 6.3 (HH) 3.5 - 5.3 mmol/L    Chloride 102 98 - 107 mmol/L    Bicarbonate 23 21 - 32 mmol/L    Anion Gap 14 10 - 20 mmol/L    Urea Nitrogen 20 6 - 23 mg/dL    Creatinine 0.61 0.50 - 1.05 mg/dL    eGFR >90 >60 mL/min/1.73m*2    Calcium 8.8 8.6 - 10.3 mg/dL    Albumin 4.3 3.4 - 5.0 g/dL    Alkaline Phosphatase 76 33 - 136 U/L    Total Protein 6.4 6.4 - 8.2 g/dL    AST 35 9 - 39 U/L    Bilirubin, Total 1.1 0.0 - 1.2 mg/dL    ALT 18 7 - 45 U/L   Troponin I, High Sensitivity   Result Value Ref Range    Troponin I, High Sensitivity <3 0 - 13 ng/L   Protime-INR   Result Value Ref Range    Protime 10.6 9.8 - 12.8 seconds    INR 0.9 0.9 - 1.1   APTT   Result Value Ref Range    aPTT 28 27 - 38 seconds   B-Type Natriuretic Peptide   Result Value Ref Range    BNP 17 0 - 99 pg/mL   Potassium   Result Value Ref Range    Potassium 3.9 3.5 - 5.3 mmol/L   Lipid Panel   Result Value Ref Range    Cholesterol 165 0 - 199 mg/dL    HDL-Cholesterol 86.3 mg/dL    Cholesterol/HDL Ratio 1.9     LDL Calculated 66 <=99 mg/dL    VLDL 12 0 - 40 mg/dL    Triglycerides 62 0 - 149 mg/dL    Non HDL Cholesterol 79 0 - 149 mg/dL   Basic Metabolic Panel   Result Value Ref Range    Glucose 88 74 - 99 mg/dL    Sodium 141 136 - 145 mmol/L    Potassium 3.2 (L) 3.5 - 5.3 mmol/L    Chloride 104 98 - 107 mmol/L    Bicarbonate 27 21 - 32 mmol/L    Anion Gap 13 10 - 20 mmol/L    Urea Nitrogen 12 6 - 23 mg/dL    Creatinine 0.54 0.50 - 1.05 mg/dL    eGFR  >90 >60 mL/min/1.73m*2    Calcium 9.1 8.6 - 10.3 mg/dL   CBC   Result Value Ref Range    WBC 5.5 4.4 - 11.3 x10*3/uL    nRBC 0.0 0.0 - 0.0 /100 WBCs    RBC 4.44 4.00 - 5.20 x10*6/uL    Hemoglobin 14.0 12.0 - 16.0 g/dL    Hematocrit 41.2 36.0 - 46.0 %    MCV 93 80 - 100 fL    MCH 31.5 26.0 - 34.0 pg    MCHC 34.0 32.0 - 36.0 g/dL    RDW 11.7 11.5 - 14.5 %    Platelets 162 150 - 450 x10*3/uL   Magnesium   Result Value Ref Range    Magnesium 2.00 1.60 - 2.40 mg/dL     Results for orders placed or performed during the hospital encounter of 04/05/24 (from the past 24 hour(s))   Red Top   Result Value Ref Range    Extra Tube Hold for add-ons.    Gray Top   Result Value Ref Range    Extra Tube Hold for add-ons.    ECG 12 lead   Result Value Ref Range    Ventricular Rate 83 BPM    Atrial Rate 83 BPM    [place] Interval 142 ms    QRS Duration 94 ms    QT Interval 384 ms    QTC Calculation(Bazett) 451 ms    P Axis 70 degrees    R Axis 71 degrees    T Axis 57 degrees    QRS Count 13 beats    Q Onset 217 ms    P Onset 146 ms    P Offset 197 ms    T Offset 409 ms    QTC Fredericia 428 ms   CBC and Auto Differential   Result Value Ref Range    WBC 6.1 4.4 - 11.3 x10*3/uL    nRBC 0.0 0.0 - 0.0 /100 WBCs    RBC 4.60 4.00 - 5.20 x10*6/uL    Hemoglobin 14.7 12.0 - 16.0 g/dL    Hematocrit 42.1 36.0 - 46.0 %    MCV 92 80 - 100 fL    MCH 32.0 26.0 - 34.0 pg    MCHC 34.9 32.0 - 36.0 g/dL    RDW 11.6 11.5 - 14.5 %    Platelets 199 150 - 450 x10*3/uL    Neutrophils % 53.0 40.0 - 80.0 %    Immature Granulocytes %, Automated 0.3 0.0 - 0.9 %    Lymphocytes % 35.9 13.0 - 44.0 %    Monocytes % 9.5 2.0 - 10.0 %    Eosinophils % 1.0 0.0 - 6.0 %    Basophils % 0.3 0.0 - 2.0 %    Neutrophils Absolute 3.22 1.60 - 5.50 x10*3/uL    Immature Granulocytes Absolute, Automated 0.02 0.00 - 0.50 x10*3/uL    Lymphocytes Absolute 2.18 0.80 - 3.00 x10*3/uL    Monocytes Absolute 0.58 0.05 - 0.80 x10*3/uL    Eosinophils Absolute 0.06 0.00 - 0.40 x10*3/uL    Basophils  Absolute 0.02 0.00 - 0.10 x10*3/uL   Comprehensive metabolic panel   Result Value Ref Range    Glucose 137 (H) 74 - 99 mg/dL    Sodium 133 (L) 136 - 145 mmol/L    Potassium 6.3 (HH) 3.5 - 5.3 mmol/L    Chloride 102 98 - 107 mmol/L    Bicarbonate 23 21 - 32 mmol/L    Anion Gap 14 10 - 20 mmol/L    Urea Nitrogen 20 6 - 23 mg/dL    Creatinine 0.61 0.50 - 1.05 mg/dL    eGFR >90 >60 mL/min/1.73m*2    Calcium 8.8 8.6 - 10.3 mg/dL    Albumin 4.3 3.4 - 5.0 g/dL    Alkaline Phosphatase 76 33 - 136 U/L    Total Protein 6.4 6.4 - 8.2 g/dL    AST 35 9 - 39 U/L    Bilirubin, Total 1.1 0.0 - 1.2 mg/dL    ALT 18 7 - 45 U/L   Troponin I, High Sensitivity   Result Value Ref Range    Troponin I, High Sensitivity <3 0 - 13 ng/L   Protime-INR   Result Value Ref Range    Protime 10.6 9.8 - 12.8 seconds    INR 0.9 0.9 - 1.1   APTT   Result Value Ref Range    aPTT 28 27 - 38 seconds   B-Type Natriuretic Peptide   Result Value Ref Range    BNP 17 0 - 99 pg/mL   Potassium   Result Value Ref Range    Potassium 3.9 3.5 - 5.3 mmol/L   Lipid Panel   Result Value Ref Range    Cholesterol 165 0 - 199 mg/dL    HDL-Cholesterol 86.3 mg/dL    Cholesterol/HDL Ratio 1.9     LDL Calculated 66 <=99 mg/dL    VLDL 12 0 - 40 mg/dL    Triglycerides 62 0 - 149 mg/dL    Non HDL Cholesterol 79 0 - 149 mg/dL   Basic Metabolic Panel   Result Value Ref Range    Glucose 88 74 - 99 mg/dL    Sodium 141 136 - 145 mmol/L    Potassium 3.2 (L) 3.5 - 5.3 mmol/L    Chloride 104 98 - 107 mmol/L    Bicarbonate 27 21 - 32 mmol/L    Anion Gap 13 10 - 20 mmol/L    Urea Nitrogen 12 6 - 23 mg/dL    Creatinine 0.54 0.50 - 1.05 mg/dL    eGFR >90 >60 mL/min/1.73m*2    Calcium 9.1 8.6 - 10.3 mg/dL   CBC   Result Value Ref Range    WBC 5.5 4.4 - 11.3 x10*3/uL    nRBC 0.0 0.0 - 0.0 /100 WBCs    RBC 4.44 4.00 - 5.20 x10*6/uL    Hemoglobin 14.0 12.0 - 16.0 g/dL    Hematocrit 41.2 36.0 - 46.0 %    MCV 93 80 - 100 fL    MCH 31.5 26.0 - 34.0 pg    MCHC 34.0 32.0 - 36.0 g/dL    RDW 11.7 11.5  - 14.5 %    Platelets 162 150 - 450 x10*3/uL   Magnesium   Result Value Ref Range    Magnesium 2.00 1.60 - 2.40 mg/dL     ECG 12 lead    Result Date: 4/6/2024  Normal sinus rhythm Possible Left atrial enlargement Junctional ST depression, probably normal Borderline ECG When compared with ECG of 02-NOV-2023 16:04, Vent. rate has increased BY  27 BPM QT has lengthened See ED provider note for full interpretation and clinical correlation Confirmed by Shira Hawthorne ([id number]) on 4/6/2024 10:08:20 AM    MR brain wo IV contrast    Result Date: 4/5/2024  Interpreted By:  Dorian Giang, STUDY: MR BRAIN WO IV CONTRAST;  4/5/2024 10:57 pm   INDICATION: Signs/Symptoms:r/o CVA.   COMPARISON: Brain attack head CT and CTA head/neck 04/05/2024   ACCESSION NUMBER(S): [id number]   ORDERING CLINICIAN: JOSÉ LUIS DAVENPOTR   TECHNIQUE: Multiplanar, multi-sequence images of the brain were obtained without contrast.   FINDINGS: Diffusion weighted images show no evidence of acute ischemic infarct.   Mild periventricular and subcortical hemispheric T2/FLAIR white matter hyperintensities are most compatible with chronic small vessel ischemic disease.   There is no acute intraparenchymal hemorrhage, mass, mass-effect, or an extra-axial fluid collection. There is a crescentic T1 hyperintense signal that marginates the inner table of the calvarium bilaterally that is slightly thicker on the right than the left and which corresponds to bone marrow signal within the calvarium when correlating with same day CT head 04/05/2024 and is unchanged from prior MRI 01/19/2024.   The ventricular size and cerebral volume are age-concordant.   Normal morphology of midline structures. The craniovertebral junction is normal.   The orbits and globes are unremarkable.   The paranasal sinuses show no air-fluid levels or hemorrhage.   The mastoid air cells are clear.       No acute infarct, acute hemorrhage, or intracranial mass effect.   Mild burden of supratentorial  chronic small vessel ischemic disease.   MACRO: None.   Signed by: Dorian Giang 4/5/2024 11:53 PM Dictation workstation:   [id number]    CT brain attack angio head and neck W and WO IV contrast    Result Date: 4/5/2024  Interpreted By:  Bonilla Ordonez and Ritchie Brandon STUDY: CT BRAIN ATTACK ANGIO HEAD AND NECK W AND WO IV CONTRAST;  4/5/2024 1:48 pm   INDICATION: Signs/Symptoms:TIA.   COMPARISON: Concurrent CT of the head 04/05/2024. MRI/MRA of the head  01/19/2024.   ACCESSION NUMBER(S): [id number]   ORDERING CLINICIAN: ROCAEL GARCIA   TECHNIQUE: Unenhanced CT images of the head were obtained. Subsequently,  60 mL Omnipaque 350 was administered intravenously and axial images of the head and neck were acquired.  Coronal, sagittal, and 3-D reconstructions were provided for review.   FINDINGS:     CTA HEAD FINDINGS:   The bilateral intracranial internal carotid arteries, bilateral carotid terminals, bilateral proximal anterior and middle cerebral arteries are patent. No evidence of aneurysm or dissection. There is mild-to-moderate scattered atherosclerotic calcification of the cavernous portions of the bilateral internal carotid arteries and carotid siphons.   Posterior circulation: Bilateral intracranial vertebral arteries, vertebrobasilar junction, basilar artery and proximal posterior cerebral arteries are normal. No evidence of aneurysm or dissection.   CTA NECK FINDINGS:   Conventional three-vessel aortic arch anatomy is noted.   There is minimal atherosclerotic calcification of the aortic arch and bilateral subclavian arteries.   Right carotid vessels: The common carotid artery is normal. Minimal calcification of the carotid bifurcation. The internal carotid artery in the neck is patent without significant flow-limiting stenosis.   Left carotid vessels: The common carotid artery is normal. Mild-to-moderate atherosclerotic calcification of the carotid bifurcation. The internal carotid artery in the neck  is patent without evidence of flow-limiting stenosis. No evidence of aneurysm or dissection.   Vertebral vessels:  There is a dominant left vertebral artery with hypoplastic right vertebral artery, likely congenital variant.   The visualized lung parenchyma is unremarkable. There is incidental note of bilateral hypodense thyroid nodules. There is a 11 mm nodule involving the right lobe of the thyroid (series 302, image 139). There is a 5 mm nodule in the left lobe of the thyroid (series 302, image 149).       1.  No evidence for significant stenosis of the cervical vessels. Mild-to-moderate atherosclerotic disease of the bilateral carotid bifurcations without evidence of flow-limiting stenosis. No evidence of aneurysm or dissection. 2.  No evidence for significant stenosis or large branch vessel cutoffs of the intracranial vessels. Minimal atherosclerotic calcification of bilateral cavernous portions of the internal carotid arteries and carotid siphons. No evidence of aneurysmal dissection. 3. Incidental thyroid nodules as described above, 11 mm nodule of the right lobe of the thyroid and 5 mm nodule in the left lobe of the thyroid.     I personally reviewed the images/study and I agree with the findings as stated. This study was interpreted at [place], [place].   MACRO: Incidental Finding:  There are few small hypoattenuating nodules measuring greater than equal to 1 cm in the thyroid gland. (**-YCF-**)   Instructions:  Further evaluation with nonemergent thyroid ultrasound. (Managing Incidental Thyroid Nodules Detected on Imaging: White Paper of the ACR Incidental Thyroid Findings Committee. Zoie Cee et al. Journal of the American College of Radiology,Volume 12, Issue 2, 143 - 150.) THYROID.ACR.IF.2   Signed by: Bonilla Ordonez 4/5/2024 2:18 PM Dictation workstation:   [id number]    CT brain attack head wo IV contrast    Result Date: 4/5/2024  Interpreted By:   Jemima Portillo, STUDY: CT BRAIN ATTACK HEAD WO IV CONTRAST;  4/5/2024 1:40 pm   INDICATION: Signs/Symptoms:Stroke Evaluation.   COMPARISON: MR brain 01/19/2024   ACCESSION NUMBER(S): [id number]   ORDERING CLINICIAN: ROCAEL GARCIA   TECHNIQUE: Noncontrast axial CT scan of head was performed. Angled reformats in brain and bone windows were generated. The images were reviewed in bone, brain, blood and soft tissue windows.   FINDINGS: CSF Spaces: The ventricles, sulci and basal cisterns are within normal limits for patient age. There is no extraaxial fluid collection.   Parenchyma: Periventricular hypodensities are comparable to findings on the patient's MRI and are consistent with chronic small-vessel ischemic changes. The grey-white differentiation is intact. There is no mass effect or midline shift.  There is no intracranial hemorrhage.   Calvarium: The calvarium is unremarkable.   Paranasal sinuses and mastoids: Visualized paranasal sinuses and mastoids are clear.       No evidence of acute cortical infarct or intracranial hemorrhage.   No evidence of intracranial hemorrhage or displaced skull fracture.   MACRO: Jemima Portillo discussed the significance and urgency of this critical finding by telephone with  ROCAEL GARCIA on 4/5/2024 at 1:48 pm.  (**-RCF-**) Findings:  See findings.     Signed by: Jemima Portillo 4/5/2024 1:49 PM Dictation workstation:   [id number]                  Assessment/Plan   Principal Problem:    TIA (transient ischemic attack)  Active Problems:    Benign essential hypertension    Gastroesophageal reflux disease without esophagitis    Generalized anxiety disorder with panic attacks    Mixed hyperlipidemia    Trigeminal neuralgia    Dysarthria, resolved  TIA  -CTA negative  -MRI negative for acute intracranial abnormality, chronic small vessel disease noted  -Tele  -Echo with bubble pending  -neuro consult appreciated    -asa/plavix x 3 weeks, then asa monotherapy    -atorvastatin 40 mg       HX of HTN  -permissive HTN  -BP stable off norvasc  -restart on discharge      HX of HLD  -Check lipids      HX of Anxiety  Home ativan PRN     HX of Osteoporosis     HX of Gastroesophageal reflux     DVT Ppx:  -lovenox    Discussed labs, results, imaging, plan with Dr. Ana Maria FINN spent 45 minutes in the professional and overall care of this patient.      DICK MerrittC

## 2024-04-06 NOTE — DISCHARGE SUMMARY
Discharge Diagnosis  TIA (transient ischemic attack)    Issues Requiring Follow-Up  TIA  HTN  HLD    Discharge Meds     Your medication list        ASK your doctor about these medications        Instructions Last Dose Given Next Dose Due   amLODIPine 10 mg tablet  Commonly known as: Norvasc      Take 1 tablet (10 mg) by mouth once daily.       aspirin 81 mg EC tablet           atorvastatin 20 mg tablet  Commonly known as: Lipitor      Take 1 tablet (20 mg) by mouth once daily.       cholecalciferol 50 mcg (2,000 unit) capsule  Commonly known as: Vitamin D-3           cyanocobalamin 100 mcg tablet  Commonly known as: Vitamin B-12           escitalopram 10 mg tablet  Commonly known as: Lexapro           famotidine 20 mg tablet  Commonly known as: Pepcid      Take 1 tablet (20 mg) by mouth once daily at bedtime.       omeprazole 20 mg DR capsule  Commonly known as: PriLOSEC      Take 1 capsule (20 mg) by mouth 2 times a day. Do not crush or chew.       risedronate 150 mg tablet  Commonly known as: Actonel                    Test Results Pending At Discharge  Pending Labs       No current pending labs.            Hospital Course   Aneta Byers is a 78 y.o. female with history of hypertension, hyperlipidemia, maternal history of prior TIAs presenting to the emergency department as a stroke alert.  patient called EMS with slurred speech.  States symptoms started around 1245.  Symptoms resolved prior to arrival to the emergency department.  She states that her symptoms lasted approximately 30 minutes.  Not associated with any vision changes, weakness, numbness in the arms and legs, no associated chest pain, shortness of breath, trauma.  Has had previous workup for right neck/facial paresthesias including MRI and MRA of the head and neck in the past.     On interview in the ED multiple family members in the room. She was at home and had slurred speech when she was talking to her son on the phone. She could not pronounce  her words. She denies this happened in the past.  She has a hot pack to her right neck for pain stiffness. She has seen her PCP for concerns of right face numbness and tingling and had a previous MRI. She was given asa/plavix and will be admitted for neuro evaluation.      Dysarthria, resolved  TIA  -CTA negative  -MRI negative for acute intracranial abnormality, chronic small vessel disease noted  -Tele  -Echo with bubble study, preserved EF, no mention PFO  -neuro consult appreciated    -asa/plavix x 3 weeks, then asa monotherapy    -atorvastatin 40 mg      HX of HTN  -permissive HTN  -BP stable off norvasc  -restart on discharge      HX of HLD  -Check lipids      HX of Anxiety  Home ativan PRN     HX of Osteoporosis     HX of Gastroesophageal reflux     DVT Ppx:  -lovenox     Discussed labs, results, imaging, plan with Dr. Rodgers    Patient to starta atorvastatin 40 mg daily, asa 81 mg daily, plavix 75 mg daily x 3 weeks. Follow up with Dr. Arvizu as discussed. May consider Holter monitor for evaluation A.fib. All questions answered, patient and family in agreement with plan of care.     Pertinent Physical Exam At Time of Discharge  Physical Exam  Constitutional:       General: She is not in acute distress.     Appearance: She is not toxic-appearing.   HENT:      Head: Normocephalic and atraumatic.      Right Ear: External ear normal.      Left Ear: External ear normal.      Nose: Nose normal. No congestion.      Mouth/Throat:      Mouth: Mucous membranes are moist.      Pharynx: Oropharynx is clear.   Eyes:      General:         Right eye: No discharge.         Left eye: No discharge.      Conjunctiva/sclera: Conjunctivae normal.      Pupils: Pupils are equal, round, and reactive to light.   Cardiovascular:      Rate and Rhythm: Normal rate and regular rhythm.      Heart sounds: No murmur heard.     No gallop.   Pulmonary:      Effort: Pulmonary effort is normal.      Breath sounds: No wheezing or rales.    Abdominal:      General: Abdomen is flat. Bowel sounds are normal.      Palpations: Abdomen is soft.      Tenderness: There is no abdominal tenderness. There is no guarding or rebound.   Musculoskeletal:         General: No swelling or tenderness. Normal range of motion.      Right lower leg: No edema.      Left lower leg: No edema.   Skin:     General: Skin is warm and dry.      Findings: No erythema or rash.   Neurological:      General: No focal deficit present.      Mental Status: She is alert.      Cranial Nerves: No cranial nerve deficit.      Motor: No weakness.   Psychiatric:         Mood and Affect: Mood normal.         Thought Content: Thought content normal.         Outpatient Follow-Up  Future Appointments   Date Time Provider Department Center   4/15/2024 11:15 AM Sarah Emmanuel MD [id number]         Leonard Morrow PA-C

## 2024-04-06 NOTE — DISCHARGE INSTRUCTIONS
The following medications have been sent to the pharmacy for you:  Atorvastatin 40 mg daily (can discontinue atorvastatin 20 mg)  Aspirin 81 mg daily  Plavix 75 mg daily x 21 days  Follow up with neurology Dr. Arvizu as scheduled  Follow up with PCP in 5-7 days, may consider Holter monitor to evaluate for arrhythmia

## 2024-04-08 ENCOUNTER — OFFICE VISIT (OUTPATIENT)
Dept: PRIMARY CARE | Facility: CLINIC | Age: 79
End: 2024-04-08
Payer: MEDICARE

## 2024-04-08 ENCOUNTER — APPOINTMENT (OUTPATIENT)
Dept: OBSTETRICS AND GYNECOLOGY | Facility: CLINIC | Age: 79
End: 2024-04-08
Payer: MEDICARE

## 2024-04-08 ENCOUNTER — PATIENT OUTREACH (OUTPATIENT)
Dept: CARE COORDINATION | Facility: CLINIC | Age: 79
End: 2024-04-08

## 2024-04-08 VITALS — DIASTOLIC BLOOD PRESSURE: 64 MMHG | SYSTOLIC BLOOD PRESSURE: 116 MMHG

## 2024-04-08 DIAGNOSIS — I67.841 REVERSIBLE CEREBROVASCULAR VASOCONSTRICTION SYNDROME: ICD-10-CM

## 2024-04-08 DIAGNOSIS — G45.9 TIA (TRANSIENT ISCHEMIC ATTACK): Primary | ICD-10-CM

## 2024-04-08 PROCEDURE — 1159F MED LIST DOCD IN RCRD: CPT | Performed by: INTERNAL MEDICINE

## 2024-04-08 PROCEDURE — 1160F RVW MEDS BY RX/DR IN RCRD: CPT | Performed by: INTERNAL MEDICINE

## 2024-04-08 PROCEDURE — 1036F TOBACCO NON-USER: CPT | Performed by: INTERNAL MEDICINE

## 2024-04-08 PROCEDURE — 3074F SYST BP LT 130 MM HG: CPT | Performed by: INTERNAL MEDICINE

## 2024-04-08 PROCEDURE — 99214 OFFICE O/P EST MOD 30 MIN: CPT | Performed by: INTERNAL MEDICINE

## 2024-04-08 PROCEDURE — 3078F DIAST BP <80 MM HG: CPT | Performed by: INTERNAL MEDICINE

## 2024-04-08 NOTE — PROGRESS NOTES
Subjective   Patient ID: Aneta Byers is a 78 y.o. female.    HPI  Patient presents today in follow-up from recent hospital admission for question of TIA  was admitted on 4/5/2024 and discharged on 4/6/2024  She was in her normal state of health  had done an exercise class and came home had slurred speechon phone with son   marleny as well but by the time  EMS came just a few mins was ok   Complete workup was performed occluding CT and MRI echo with bubble study  She did have a neurology consult who did feel that may have had TIA recommended dual antiplatelet therapy for the next 3 weeks and then switching just to 81 mg  aspirin daily   Consider follow-up Holter to rule out atrial fibrillation  Follow-up with neurology as well possibility of an atypical migraine was raised also    Since returning home has been okay no further issues her tongue feels somewhat thick at times  She denies any palpitations    She also said issues with postnasal drip  He does have a longstanding history of anxiety  Osteoporosis  Gastroesophageal reflux  Recent issues with neuralgia of the face and did have a very complete workup with this as well    Review of Systems    Objective   Physical Exam  Well-developed no acute distress  HEENT exam is unremarkable  Cranial nerves II through XII are intact without deficits noted  There are no carotid bruits noted  Cardiovascular regular rate and rhythm  Lungs are clear  No neurologic deficits are noted            === 04/05/24 ===    CT BRAIN ATTACK ANGIO HEAD AND NECK W AND WO IV CONTRAST    - Impression -  1.  No evidence for significant stenosis of the cervical vessels.  Mild-to-moderate atherosclerotic disease of the bilateral carotid  bifurcations without evidence of flow-limiting stenosis. No evidence  of aneurysm or dissection.  2.  No evidence for significant stenosis or large branch vessel  cutoffs of the intracranial vessels. Minimal atherosclerotic  calcification of bilateral  cavernous portions of the internal carotid  arteries and carotid siphons. No evidence of aneurysmal dissection.  3. Incidental thyroid nodules as described above, 11 mm nodule of the  right lobe of the thyroid and 5 mm nodule in the left lobe of the  thyroid.      I personally reviewed the images/study and I agree with the findings  as stated. This study was interpreted at [facility], [place], [place].    MACRO:  Incidental Finding:  There are few small hypoattenuating nodules  measuring greater than equal to 1 cm in the thyroid gland.  (**-[facility]-**)    Instructions:  Further evaluation with nonemergent thyroid ultrasound.  (Managing Incidental Thyroid Nodules Detected on Imaging: White Paper  of the ACR Incidental Thyroid Findings Committee. Zoie Cee. et  al. Journal of the American College of Radiology,Volume 12, Issue 2,  143 - 150.) [email or web address]    Signed by: Bonilla Ordonez 4/5/2024 2:18 PM  Dictation workstation:   [id number]   === 04/05/24 ===    MR BRAIN WO CONTRAST    - Impression -  No acute infarct, acute hemorrhage, or intracranial mass effect.    Mild burden of supratentorial chronic small vessel ischemic disease.    MACRO:  None.    Signed by: Dorian Giang 4/5/2024 11:53 PM  Dictation workstation:   [id number]     Echocardiogram is normal  Assessment/Plan   #1 transient episode of dysarthria most consistent with TIA interestingly could be atypical migraine as well workup has been negative including CT MRI note had had MRI of her brain not terribly long ago echocardiogram is normal as well we will continue plan with Plavix and aspirin for 3 weeks and aspirin alone following that I have ordered carotid Doppler ultrasound as well as Holter monitor follow-up with neurology vascular as well reassurance was given  2.  Postnasal drip she will try Flonase nasal spray  3.  Hypertension excellent control  4.  Anxiety stable  5.  Neuralgia more of her trigeminal  neuralgia symptoms persist somewhat intermittent  35 minutes were spent with patient of which greater than 50% was spent in counseling and coordination of care  This note was partially generated using the Dragon voice recognition system.  There may be some incorrect wording ,grammar, spelling or punctuation errors that were not corrected prior to committing the note to the medical record.

## 2024-04-08 NOTE — PATIENT INSTRUCTIONS
I am glad you are doing so well after recent hospitalization.  The working diagnosis is TIA I do think this is possible that this could have represented an atypical migraine as well  For completeness we are going to follow-up with a carotid Doppler ultrasound and a Holter monitor.  Continue plan as discussed by neurology with Plavix and aspirin for 3 weeks and then 81 mg aspirin indefinitely.  We are going to have you see neurology in follow-up as well  From the standpoint of postnasal drip Flonase nasal spray 2 squirts into each nostril daily is recommended

## 2024-04-08 NOTE — PROGRESS NOTES
Outreach call to patient to support a smooth transition of care from recent admission.  Spoke with patient, reviewed discharge medications, discharge instructions, assessed social needs, and provided education on importance of follow-up appointment with provider. Enrolled patient in [employer] chatbot for additional support and education through transition period.  Will continue to monitor through transition period.  Medications  Medications reviewed with patient/caregiver?: Yes (4/8/2024  2:13 PM)  Is the patient having any side effects they believe may be caused by any medication additions or changes?: No (4/8/2024  2:13 PM)  Does the patient have all medications ordered at discharge?: Yes (4/8/2024  2:13 PM)  Care Management Interventions: No intervention needed (4/8/2024  2:13 PM)  Is the patient taking all medications as directed (includes completed medication regime)?: Yes (4/8/2024  2:13 PM)    Appointments  Does the patient have a primary care provider?: Yes (4/8/2024  2:13 PM)  Has the patient kept scheduled appointments due by today?: Yes (4/8/2024  2:13 PM)    Patient Teaching  Does the patient have access to their discharge instructions?: Yes (4/8/2024  2:13 PM)  What is the patient's perception of their health status since discharge?: Improving (4/8/2024  2:13 PM)      khoa

## 2024-04-12 ENCOUNTER — TELEPHONE (OUTPATIENT)
Dept: PRIMARY CARE | Facility: CLINIC | Age: 79
End: 2024-04-12
Payer: MEDICARE

## 2024-04-12 DIAGNOSIS — H91.8X3 OTHER SPECIFIED HEARING LOSS OF BOTH EARS: Primary | ICD-10-CM

## 2024-04-12 NOTE — TELEPHONE ENCOUNTER
Pt is calling in regards to needing a Audiology referral in the system and has Neurology appt in June and was making sure its okay to wait until than to see someone - JOHANNA

## 2024-04-15 ENCOUNTER — APPOINTMENT (OUTPATIENT)
Dept: OBSTETRICS AND GYNECOLOGY | Facility: CLINIC | Age: 79
End: 2024-04-15
Payer: MEDICARE

## 2024-04-25 ENCOUNTER — HOSPITAL ENCOUNTER (OUTPATIENT)
Dept: CARDIOLOGY | Facility: HOSPITAL | Age: 79
Discharge: HOME | End: 2024-04-25
Payer: MEDICARE

## 2024-04-25 ENCOUNTER — HOSPITAL ENCOUNTER (OUTPATIENT)
Dept: VASCULAR MEDICINE | Facility: HOSPITAL | Age: 79
Discharge: HOME | End: 2024-04-25
Payer: MEDICARE

## 2024-04-25 DIAGNOSIS — G45.9 TIA (TRANSIENT ISCHEMIC ATTACK): ICD-10-CM

## 2024-04-25 DIAGNOSIS — R09.89 OTHER SPECIFIED SYMPTOMS AND SIGNS INVOLVING THE CIRCULATORY AND RESPIRATORY SYSTEMS: ICD-10-CM

## 2024-04-25 DIAGNOSIS — I67.841 REVERSIBLE CEREBROVASCULAR VASOCONSTRICTION SYNDROME: ICD-10-CM

## 2024-04-25 DIAGNOSIS — I67.81 ACUTE CEREBROVASCULAR INSUFFICIENCY: ICD-10-CM

## 2024-04-25 PROCEDURE — 93248 EXT ECG>7D<15D REV&INTERPJ: CPT | Performed by: INTERNAL MEDICINE

## 2024-04-25 PROCEDURE — 93246 EXT ECG>7D<15D RECORDING: CPT

## 2024-04-25 PROCEDURE — 93880 EXTRACRANIAL BILAT STUDY: CPT | Performed by: INTERNAL MEDICINE

## 2024-04-25 PROCEDURE — 93880 EXTRACRANIAL BILAT STUDY: CPT

## 2024-05-01 ENCOUNTER — APPOINTMENT (OUTPATIENT)
Dept: OBSTETRICS AND GYNECOLOGY | Facility: CLINIC | Age: 79
End: 2024-05-01
Payer: MEDICARE

## 2024-05-10 ENCOUNTER — DOCUMENTATION (OUTPATIENT)
Dept: CARE COORDINATION | Facility: CLINIC | Age: 79
End: 2024-05-10
Payer: MEDICARE

## 2024-05-10 NOTE — PROGRESS NOTES
Outreach call to patient to check in 30 days after hospital discharge to support smooth transition of care.  Patient with no additional needs noted. No additional outreach needed at this time.   khoa

## 2024-05-13 DIAGNOSIS — I10 BENIGN ESSENTIAL HYPERTENSION: ICD-10-CM

## 2024-05-13 RX ORDER — AMLODIPINE BESYLATE 10 MG/1
10 TABLET ORAL DAILY
Qty: 90 TABLET | Refills: 3 | Status: SHIPPED | OUTPATIENT
Start: 2024-05-13

## 2024-05-14 ENCOUNTER — CLINICAL SUPPORT (OUTPATIENT)
Dept: AUDIOLOGY | Facility: CLINIC | Age: 79
End: 2024-05-14
Payer: MEDICARE

## 2024-05-14 DIAGNOSIS — H90.3 SENSORINEURAL HEARING LOSS (SNHL) OF BOTH EARS: ICD-10-CM

## 2024-05-14 DIAGNOSIS — H90.3 SENSORINEURAL HEARING LOSS (SNHL) OF BOTH EARS: Primary | ICD-10-CM

## 2024-05-14 PROCEDURE — 92552 PURE TONE AUDIOMETRY AIR: CPT | Performed by: AUDIOLOGIST

## 2024-05-14 PROCEDURE — 92556 SPEECH AUDIOMETRY COMPLETE: CPT | Performed by: AUDIOLOGIST

## 2024-05-14 PROCEDURE — 1160F RVW MEDS BY RX/DR IN RCRD: CPT

## 2024-05-14 PROCEDURE — 1159F MED LIST DOCD IN RCRD: CPT | Performed by: AUDIOLOGIST

## 2024-05-14 PROCEDURE — 1160F RVW MEDS BY RX/DR IN RCRD: CPT | Performed by: AUDIOLOGIST

## 2024-05-14 PROCEDURE — 1159F MED LIST DOCD IN RCRD: CPT

## 2024-05-14 NOTE — PROGRESS NOTES
AUDIOLOGIC EVALUATION    Name:  Aneta Byers  :  1945  Age:  78 y.o.    HISTORY:     Patient seen for recheck of hearing.  No reported concerns regarding progression of hearing loss.  She denied pain, pressure, and vertigo.    EVALUATION:    See Audiogram.    RESULTS:    Otoscopy:  Right: Clear canal with minimal non-occluding cerumen, normal color and appearance of tympanic membrane.  Left: Clear canal with minimal non-occluding cerumen, normal color and appearance of tympanic membrane.    Tympanometry:  Right: Did not test  Left: Did not test    Hearing Sensitivity:  Right: Hearing sensitivity within normal limits gradually sloping to moderate sensorineural hearing loss.  Left: Hearing sensitivity within normal limits gradually sloping to moderate sensorineural hearing loss.    Word Recognition Score (NU-6 ordered by difficulty 5 and 6):  Right: Excellent (90%) at 70 dB.  Left: Excellent (90%) at 70 dB.    IMPRESSIONS:    Compared to previous hearing evaluation on 2023, no significant changes indicated.    ASSESSMENT AND PLAN:    - Continue medical follow-up with Dr. Mckeon.  - Consider hearing needs assessment to discuss management of hearing loss.  - Monitor and recheck hearing as warranted.  - Counseled regarding results and recommendations.      Va Parrish  Clinical Audiologist

## 2024-05-16 NOTE — PROGRESS NOTES
HEARING NEEDS ASSESSMENT    Name:  Aneta Byers  :  1945  Age:  78 y.o.    HISTORY:    Patient was seen for hearing needs assessment.  She has an insurance benefit described as the following: $1000 reimbursement every 36 months, patient submits for reimbursement.    EXAM/PROCEDURES:    Reviewed patient's hearing loss.  Reviewed patient's insurance coverage.      Demonstrated Phonak Audeo L90-R and Oticon Intent 1 miniRITE-R hearing aids. Discussed patient's communication needs and concerns.  Discussed patient's expectations and goals for use of hearing aids.  Discussed styles of hearing aids, levels of technology, rechargeable considerations, and Bluetooth technologies.  Reviewed pricing and TLC program.    Devices chosen: Oticon Intent 3 miniRITE-R, color 92, 2(85) receivers, 6 mm open bass domes. PROMO: Extra .    RECOMMENDATIONS AND FOLLOW-UP:    - Return in approximately 2 weeks for hearing aid fitting.  - Patient is to contact the office sooner if concerns arise.  - Monitor and recheck hearing as warranted.  - Counseled patient regarding recommendations.    Va Hahn  Clinical Audiologist

## 2024-05-20 ENCOUNTER — TELEPHONE (OUTPATIENT)
Dept: PRIMARY CARE | Facility: CLINIC | Age: 79
End: 2024-05-20
Payer: MEDICARE

## 2024-06-17 DIAGNOSIS — G45.9 TIA (TRANSIENT ISCHEMIC ATTACK): ICD-10-CM

## 2024-06-17 DIAGNOSIS — F41.0 GENERALIZED ANXIETY DISORDER WITH PANIC ATTACKS: ICD-10-CM

## 2024-06-17 DIAGNOSIS — I10 BENIGN ESSENTIAL HYPERTENSION: ICD-10-CM

## 2024-06-17 DIAGNOSIS — F41.1 GENERALIZED ANXIETY DISORDER WITH PANIC ATTACKS: ICD-10-CM

## 2024-06-17 RX ORDER — ATORVASTATIN CALCIUM 40 MG/1
40 TABLET, FILM COATED ORAL DAILY
Qty: 30 TABLET | Refills: 0 | Status: SHIPPED | OUTPATIENT
Start: 2024-06-17 | End: 2024-07-17

## 2024-06-17 RX ORDER — ESCITALOPRAM OXALATE 10 MG/1
10 TABLET ORAL DAILY
Qty: 90 TABLET | Refills: 3 | Status: SHIPPED | OUTPATIENT
Start: 2024-06-17

## 2024-06-17 RX ORDER — AMLODIPINE BESYLATE 10 MG/1
10 TABLET ORAL DAILY
Qty: 90 TABLET | Refills: 3 | Status: SHIPPED | OUTPATIENT
Start: 2024-06-17

## 2024-06-18 ENCOUNTER — APPOINTMENT (OUTPATIENT)
Dept: AUDIOLOGY | Facility: CLINIC | Age: 79
End: 2024-06-18
Payer: MEDICARE

## 2024-06-18 DIAGNOSIS — H90.3 SENSORINEURAL HEARING LOSS (SNHL) OF BOTH EARS: Primary | ICD-10-CM

## 2024-06-18 PROBLEM — I67.841 REVERSIBLE CEREBROVASCULAR VASOCONSTRICTION SYNDROME: Status: ACTIVE | Noted: 2024-06-18

## 2024-06-18 NOTE — PROGRESS NOTES
HEARING AID FITTING    Name:  Aneta Byers  :  1945  Age:  79 y.o.    HEARING AID INFORMATION:    Right Hearing Aid  Oticon Intent 3 miniRITE-R  SN: [id number]  Warranty: 2027  Left Hearing Aid  Oticon Intent 3 miniRITE-R  SN: [id number]  Warranty: 2027    SN: [id number]  Extra : [id number]   Length   Right: 2(60)  Left: 2(85)  Dome/Earmold  Right: 5 mm open bass  Left: 5 mm open bass  Wax Filter  Prowax miniFit  Battery Size  Rechargeable  TLC Expiration:  2026      EXAM/PROCEDURES:    Hearing aids programmed to most recent user audiogram.  Set to adaptation level 3.  Reviewed alerting tones.  Volume control active.    Reviewed maintenance and care. Wax filter and dome replacement will be discussed at follow-up appointment.  Practiced insertion, removal, charging, and VC use.  Patient demonstrated adequate manipulation of the devices. Paired devices to patient's smartphone. Demonstrated use of mitzi. Successful test call completed.    Completed hearing aid fitting checklist, TLC agreement, and purchase agreement.    Patient paid for hearing aids today. Recall, her insurance offers a reimbursement benefit that patient must submit for. Patient will be provided with the diagnosis and CPT codes and any other information required by her insurance.    RECOMMENDATIONS AND FOLLOW-UP:    - Continue medical follow-up with Dr. Mckeon.  - Return for 2-week hearing aid follow-up.  - Continue use of amplification and follow-up as recommended for hearing aid maintenance and adjustments.      Va Hahn  Clinical Audiologist

## 2024-06-21 ENCOUNTER — OFFICE VISIT (OUTPATIENT)
Dept: NEUROLOGY | Facility: CLINIC | Age: 79
End: 2024-06-21
Payer: MEDICARE

## 2024-06-21 VITALS
RESPIRATION RATE: 18 BRPM | DIASTOLIC BLOOD PRESSURE: 68 MMHG | BODY MASS INDEX: 21.93 KG/M2 | WEIGHT: 116 LBS | HEART RATE: 63 BPM | SYSTOLIC BLOOD PRESSURE: 116 MMHG

## 2024-06-21 DIAGNOSIS — G45.9 TIA (TRANSIENT ISCHEMIC ATTACK): ICD-10-CM

## 2024-06-21 PROCEDURE — 99214 OFFICE O/P EST MOD 30 MIN: CPT | Mod: GC | Performed by: PSYCHIATRY & NEUROLOGY

## 2024-06-21 PROCEDURE — 3078F DIAST BP <80 MM HG: CPT | Performed by: PSYCHIATRY & NEUROLOGY

## 2024-06-21 PROCEDURE — 3074F SYST BP LT 130 MM HG: CPT | Performed by: PSYCHIATRY & NEUROLOGY

## 2024-06-21 PROCEDURE — 1159F MED LIST DOCD IN RCRD: CPT | Performed by: PSYCHIATRY & NEUROLOGY

## 2024-06-21 PROCEDURE — 99214 OFFICE O/P EST MOD 30 MIN: CPT | Performed by: PSYCHIATRY & NEUROLOGY

## 2024-06-21 PROCEDURE — 1160F RVW MEDS BY RX/DR IN RCRD: CPT | Performed by: PSYCHIATRY & NEUROLOGY

## 2024-06-21 PROCEDURE — 1126F AMNT PAIN NOTED NONE PRSNT: CPT | Performed by: PSYCHIATRY & NEUROLOGY

## 2024-06-21 PROCEDURE — 1036F TOBACCO NON-USER: CPT | Performed by: PSYCHIATRY & NEUROLOGY

## 2024-06-21 RX ORDER — LORAZEPAM 0.5 MG/1
0.5 TABLET ORAL EVERY 6 HOURS PRN
COMMUNITY
End: 2024-06-21 | Stop reason: ALTCHOICE

## 2024-06-21 ASSESSMENT — ACTIVITIES OF DAILY LIVING (ADL)
BED_TO_CHAIR_AND_BACK: INDEPENDENT
TOTAL_SCORE: 100
BATHING: INDEPENDENT (OR IN SHOWER)
MOBILITY_LEVEL_SURFACES: INDEPENDENT (BUT MAY USE ANY AID FOR EXAMPLE, STICK) > 50 YARDS
TOILET_USE: INDEPENDENT (ON AND OFF, DRESSING, WIPING)
BLADDER: CONTINENT
GROOMING: INDEPENDENT FACE/HAIR/TEETH.SHAVING (IMPLEMENTS PROVIDED)
STAIRS: INDEPENDENT
DRESSING: INDEPENDENT (INCLUDING BUTTONS, ZIPS, LACES,ETC.)
BOWELS: INDEPENDENT (INCLUDING BUTTONS, ZIPS, LACES, ETC.)
FEEDING: INDEPENDENT

## 2024-06-21 ASSESSMENT — PAIN SCALES - GENERAL: PAINLEVEL: 0-NO PAIN

## 2024-06-21 NOTE — PROGRESS NOTES
" Vascular Neurology NPV:    HPI: Aneta Byers is a 79 y.o. right handed female with HTN and HLD who presents to stroke clinic for a follow up of TIA.     They were seen at [facility] in April 2024 for symptoms of dysarthria and slurred speech while cleaning up the kitchen. Her symptoms spontaneously resolved about 10-15 minutes later. CTH was negative. MRI/MRA head was also negative. Vessel imaging was negative.  TTE showed 60-65% EF without mention of PFO. LDL was 66.  Patient was sent home with [facility] for 2 weeks. Etiology thought to be Large artery atherosclerosis (embolism/thrombosis) vs. Cardioembolic. Patient was discharged to home. Patient was started on DAPT per POINT x21 days. Patient was continued on Atorvastatin 40mg.     Since being discharged, patient reports that she has been doing well. She has been swimming, playing tennis, and exercising. This is her baseline level of activity. She is a former teacher. For a while, she thought her tongue felt \"heavy\". She is independent with all activities and is drives. She finished her 21 day course of plavix and continues to take aspirin daily.     ROS: Denies chest pain, SOB, abdominal pain today.     PMH:   Past Medical History:   Diagnosis Date    Constipation     UZMA (generalized anxiety disorder)     GERD (gastroesophageal reflux disease)     Hiatal hernia     Panic disorder (episodic paroxysmal anxiety)     Peroneal nerve palsy     Personal history of colonic polyps     Trigger finger, right middle finger     Vasovagal syncope     Venous insufficiency of both lower extremities     Vertigo         PSH:   Past Surgical History:   Procedure Laterality Date    COLONOSCOPY      DILATION AND CURETTAGE OF UTERUS      OVARIAN CYST REMOVAL      VAGINAL MASS EXCISION          Allergies:   Allergies   Allergen Reactions    Sulfa (Sulfonamide Antibiotics) Headache        FH:   Family History   Problem Relation Name Age of Onset    Heart failure Mother      Leukemia " Mother      Heart failure Father      Dementia Father      Breast cancer Maternal Grandmother      Heart attack Maternal Grandfather      Colon cancer Paternal Grandmother      Breast cancer Other grandmother         SH: Lives in house with . Feels safe at home. Denies smoking, Alcohol use occasionally Illicit drug used janett Occupation: former teacher     Objective:    Visit Vitals  /68 (BP Location: Left arm, Patient Position: Sitting, BP Cuff Size: Small adult)   Pulse 63   Resp 18        [unfilled]       Current Outpatient Medications:     amLODIPine (Norvasc) 10 mg tablet, Take 1 tablet (10 mg) by mouth once daily., Disp: 90 tablet, Rfl: 3    atorvastatin (Lipitor) 40 mg tablet, Take 1 tablet (40 mg) by mouth once daily., Disp: 30 tablet, Rfl: 0    cholecalciferol (Vitamin D-3) 50 mcg (2,000 unit) capsule, Take 1 capsule (50 mcg) by mouth once daily., Disp: , Rfl:     cyanocobalamin (Vitamin B-12) 100 mcg tablet, Take by mouth., Disp: , Rfl:     escitalopram (Lexapro) 10 mg tablet, Take 1 tablet (10 mg) by mouth once daily., Disp: 90 tablet, Rfl: 3    famotidine (Pepcid) 20 mg tablet, Take 1 tablet (20 mg) by mouth once daily at bedtime., Disp: 60 tablet, Rfl: 2    omeprazole (PriLOSEC) 20 mg DR capsule, Take 1 capsule (20 mg) by mouth 2 times a day. Do not crush or chew., Disp: 60 capsule, Rfl: 2    risedronate (Actonel) 150 mg tablet, PLEASE SEE ATTACHED FOR DETAILED DIRECTIONS, Disp: , Rfl:      Barthel Index  Feeding: independent  Bathing: independent (or in shower)  Grooming: independent face/hair/teeth.shaving (implements provided)  Dressing : independent (including buttons, zips, laces,etc.)  Bowels: independent (including buttons, zips, laces, etc.)  Bladder: continent  Toilet Use : independent (on and off, dressing, wiping)  Transfers (Bed to chair and back) : independent  Mobility (On level surfaces): independent (but may use any aid for example, stick) > 50 yards  Stairs:  independent  Total (0-100): 100   Modified [place] (mRS)  Modified [place] Score: No symptoms.         Extensive review of notes in EMR, labs, tests, Interpretation of neuroimaging  MRI head results: MR brain wo IV contrast    Result Date: 4/5/2024  Interpreted By:  Dorian Giang, STUDY: MR BRAIN WO IV CONTRAST;  4/5/2024 10:57 pm   INDICATION: Signs/Symptoms:r/o CVA.   COMPARISON: Brain attack head CT and CTA head/neck 04/05/2024   ACCESSION NUMBER(S): [id number]   ORDERING CLINICIAN: JOSÉ LUIS DAVENPORT   TECHNIQUE: Multiplanar, multi-sequence images of the brain were obtained without contrast.   FINDINGS: Diffusion weighted images show no evidence of acute ischemic infarct.   Mild periventricular and subcortical hemispheric T2/FLAIR white matter hyperintensities are most compatible with chronic small vessel ischemic disease.   There is no acute intraparenchymal hemorrhage, mass, mass-effect, or an extra-axial fluid collection. There is a crescentic T1 hyperintense signal that marginates the inner table of the calvarium bilaterally that is slightly thicker on the right than the left and which corresponds to bone marrow signal within the calvarium when correlating with same day CT head 04/05/2024 and is unchanged from prior MRI 01/19/2024.   The ventricular size and cerebral volume are age-concordant.   Normal morphology of midline structures. The craniovertebral junction is normal.   The orbits and globes are unremarkable.   The paranasal sinuses show no air-fluid levels or hemorrhage.   The mastoid air cells are clear.       No acute infarct, acute hemorrhage, or intracranial mass effect.   Mild burden of supratentorial chronic small vessel ischemic disease.   MACRO: None.   Signed by: Dorian Giang 4/5/2024 11:53 PM Dictation workstation:   [id number]    MR brain w and wo IV contrast    Result Date: 1/19/2024  Interpreted By:  Isamar Aguilar, STUDY: MR BRAIN W AND WO IV CONTRAST; MR ANGIO HEAD WO IV CONTRAST    INDICATION: Signs/Symptoms:trigeminal neuralgia, right   COMPARISON: None.   ACCESSION NUMBER(S): [id number]; [id number]   ORDERING CLINICIAN: GRISEL BYRNE   TECHNIQUE: Multi-planar multi-sequential MR imaging of the brain was performed before and after the administration of 10 cc Dotarem intravenous contrast; special attention was made to the brainstem centered at the trigeminal nerves.. MRA of the brain was performed utilizing 3-D time-of-flight, without intravenous contrast.   FINDINGS: MRI BRAIN: Normal course and appearance of the trigeminal nerves. No vascular structure contacting or deforming the trigeminal nerves. No asymmetric enhancement or signal within the Meckel's caves or cavernous sinuses. No acute infarction, intracranial hemorrhage or mass lesion. Moderate microangiopathic disease.   No hydrocephalus.  No extra-axial fluid collections. The skull base flow voids are present.   The visualized intraorbital contents are normal. The imaged portions of the paranasal sinuses are clear. The mastoid air cells are clear. The visualized osseous structures, soft tissues and partially visualized parotid glands appear normal.   MRA HEAD:   Normal distal internal carotid arteries.   The proximal anterior, middle and posterior cerebral arteries are patent bilaterally. Decreased flow related enhancement within the proximal right M2 superior and inferior division branches is thought to represent artifact from in plane flow rather than true stenosis.   Normal vertebrobasilar system.   No evidence of vascular stenosis, occlusion, aneurysm or vascular malformation.       Normal appearance of the trigeminal nerves. Moderate microangiopathic disease. No acute intracranial abnormality.   Intracranial vasculature is within normal limits.   Signed by: Isamar Aguilar 1/19/2024 10:41 AM Dictation workstation:   [id number] , CT head results: No CT head results found for the past 12 months, and Brain vessel imaging  results: Vascular US carotid artery duplex bilateral    Result Date: 4/25/2024             [place] [address]   Tel [phone] and Fax [phone]  Vascular Lab Report [facility] US CAROTID ARTERY DUPLEX BILATERAL  Patient Name:      LIZETH ALEJO ESTHERBAKARI        Reading Physician:  [id number] Jovon Whlaen MD, RPVI Study Date:        4/25/2024            Ordering Physician: [id number] GRISEL BYRNE MRN/PID:           [id number]             Technologist:       Kevin Washburn RVT Accession#:        [id number]         Technologist 2: Date of Birth/Age: 1945 / 78 years Encounter#:         [id number] Gender:            F Admission Status:  Outpatient           Location Performed: [facility]  Diagnosis/ICD: Other specified symptoms and signs involving the circulatory and                respiratory systems-R09.89; Acute cerebrovascular                insufficiency-I67.81 CPT Codes:     57717 Cerebrovascular Carotid Duplex scan complete  CONCLUSIONS: Right Carotid: Findings are consistent with less than 50% stenosis of the right proximal internal carotid artery. Laminar flow seen by color Doppler. Right external carotid artery appears patent with no evidence of stenosis. The right vertebral artery is patent with antegrade flow. No evidence of hemodynamically significant stenosis in the right subclavian artery. Left Carotid: Findings are consistent with less than 50% stenosis of the left proximal internal carotid artery. Laminar flow seen by color Doppler. Left external carotid artery appears patent with no evidence of stenosis. The left vertebral artery is patent with antegrade flow. No evidence of hemodynamically significant stenosis in the left subclavian artery.  Imaging & Doppler Findings: Right Plaque Morph: The proximal right internal carotid artery  demonstrates heterogenous plaque. Left Plaque Morph: The proximal left internal carotid artery demonstrates heterogenous plaque.   Right                        Left   PSV      EDV                PSV      EDV 81 cm/s  8 cm/s    CCA P    94 cm/s  15 cm/s 92 cm/s  16 cm/s   CCA D    79 cm/s  15 cm/s 70 cm/s  13 cm/s   ICA P    50 cm/s  15 cm/s 60 cm/s  16 cm/s   ICA M    59 cm/s  18 cm/s 66 cm/s  18 cm/s   ICA D    57 cm/s  20 cm/s 80 cm/s  6 cm/s     ECA     77 cm/s  9 cm/s 44 cm/s  9 cm/s  Vertebral  53 cm/s  12 cm/s 174 cm/s 0 cm/s  Subclavian 145 cm/s 0 cm/s               Right Left ICA/CCA Ratio  0.8  0.6   [id number] Jovon Whalen MD, RPVI Electronically signed by [id number] Jovon Whalen MD, RPVI on 4/25/2024 at 2:00:23 PM  ** Final **     MR angio head wo IV contrast    Result Date: 1/19/2024  Interpreted By:  Isamar Aguilar, STUDY: MR BRAIN W AND WO IV CONTRAST; MR ANGIO HEAD WO IV CONTRAST   INDICATION: Signs/Symptoms:trigeminal neuralgia, right   COMPARISON: None.   ACCESSION NUMBER(S): [id number]; [id number]   ORDERING CLINICIAN: GRISEL BYRNE   TECHNIQUE: Multi-planar multi-sequential MR imaging of the brain was performed before and after the administration of 10 cc Dotarem intravenous contrast; special attention was made to the brainstem centered at the trigeminal nerves.. MRA of the brain was performed utilizing 3-D time-of-flight, without intravenous contrast.   FINDINGS: MRI BRAIN: Normal course and appearance of the trigeminal nerves. No vascular structure contacting or deforming the trigeminal nerves. No asymmetric enhancement or signal within the Meckel's caves or cavernous sinuses. No acute infarction, intracranial hemorrhage or mass lesion. Moderate microangiopathic disease.   No hydrocephalus.  No extra-axial fluid collections. The skull base flow voids are present.   The visualized intraorbital contents are normal. The imaged portions of the paranasal sinuses are clear. The mastoid air cells  are clear. The visualized osseous structures, soft tissues and partially visualized parotid glands appear normal.   MRA HEAD:   Normal distal internal carotid arteries.   The proximal anterior, middle and posterior cerebral arteries are patent bilaterally. Decreased flow related enhancement within the proximal right M2 superior and inferior division branches is thought to represent artifact from in plane flow rather than true stenosis.   Normal vertebrobasilar system.   No evidence of vascular stenosis, occlusion, aneurysm or vascular malformation.       Normal appearance of the trigeminal nerves. Moderate microangiopathic disease. No acute intracranial abnormality.   Intracranial vasculature is within normal limits.   Signed by: Isamar Aguilar 1/19/2024 10:41 AM Dictation workstation:   [id number]       Neurologic Exam:    Mental Status: Oriented to person, place, and time. Language is fluent. Naming intact to low and high frequency objects. Repetition intact. Follows complex, cross body commands.   Cranial Nerves:   II: Pupils equal and reactive without evidence of APD. Visual fields are full to finger counting.   III/IV/VI: EOMI without nystagmus.   V: Facial sensation intact and symmetric. Strong muscles of mastication.   VII: Face symmetric without evidence of facial droop.   VIII: Hearing intact bilaterally to finger rub.  IX: Palate elevates symmetrically. Uvula midline.  XII: Tongue protrudes midline   Motor: Bulk is normal. Tone is normal. No pronator drift. No orbiting. Formal strength testing reveals 5/5 strength in bilateral upper and lower extremities. No abnormal movements.  Sensation: Sensation is intact and symmetric to light touch in bilateral upper and lower extremities. Negative Romberg.  Reflexes: 2+ throughout. Down-going toes. Negative Guerrier.   Coordination: No evidence of ataxia in upper or lower extremities.   Gait: Walks without assistive device. Fast paced gait. Narrow based gait that is  steady. Heel, toe, and tandem gait intact.    Assessment/Plan:  This is a 79-year-old female with a past medical history of hypertension, hyperlipidemia who was admitted to [facility] in April 2024 for symptoms of slurred speech.  Symptoms resolved spontaneously and was felt to be secondary to TIA.  She has completed 21 days of aspirin and Plavix and continues on aspirin daily.  Since then, she has done very well and maintains a high level of activity.     Advised her to monitor her heart rate and notify her PCP should she feel an irregular rhythm to check for atrial fibrillation   2.   Continue aspirin daily  3.   Follow up with me as needed     Patient seen and discussed with attending. Note is not finalized until signed by attending physician.     George Borrego, DO PGY2  Physical Medicine & Rehabilitation   [facility]

## 2024-06-27 ENCOUNTER — APPOINTMENT (OUTPATIENT)
Dept: AUDIOLOGY | Facility: CLINIC | Age: 79
End: 2024-06-27
Payer: MEDICARE

## 2024-06-27 DIAGNOSIS — H90.3 SENSORINEURAL HEARING LOSS (SNHL) OF BOTH EARS: Primary | ICD-10-CM

## 2024-06-27 NOTE — PROGRESS NOTES
HEARING AID CHECK    Name:  Aneta Byers  :  1945  Age:  79 y.o.    HEARING AID INFORMATION:    Right Hearing Aid  Oticon Intent 3 miniRITE-R  SN: [id number]  Warranty: 2027  Left Hearing Aid  Oticon Intent 3 miniRITE-R  SN: [id number]  Warranty: 2027    SN: [id number]  Extra : [id number]   Length   Right: 2(60)  Left: 2(85)  Dome/Earmold  Right: 5 mm open bass  Left: 5 mm open bass  Wax Filter  Prowax miniFit  Battery Size  Rechargeable  TLC Expiration:  2026      HISTORY:    Patient was seen for first follow-up after fitting of the above devices.  She reported she has been struggling to get them in her ears properly.  Because of this, she has not perceived significant benefit from them.    EXAM/PROCEDURES:    Practiced with patient how to properly insert hearing aids.  Patient was flipping the orientation of the hearing aid when trying to put it behind her ear.  When she had the correct orientation, she was successfully able to insert the  into her canal.    Connected hearing aids to fitting software.  Confirmed no firmware update.  It was determined that the right hearing aid was at level 1 adaptation while the left hearing aid was at level 3.  Increased the right side to level 3 adaptation.  On-ear speech mapping was measured and appropriate gain changes we made in order to meet targets at soft (55 dB), medium (65 dB), loud (75 dB), and MPO levels.     Patient reported the changes made were a little bit loud, so adaptation level was reduced to level 2 and set to increase back to level 3 over the next 3 weeks.    RECOMMENDATIONS AND FOLLOW-UP:    - Continue use of amplification and follow-up as recommended for hearing aid maintenance and adjustments.  - Return for 30-day follow-up HAC. Patient to contact the office sooner if problems arise.  - Monitor and recheck hearing as warranted.  - Counseled regarding results and recommendations.      Lorenza BORJA  [facility] [place]  Clinical Audiologist

## 2024-07-09 DIAGNOSIS — G45.9 TIA (TRANSIENT ISCHEMIC ATTACK): ICD-10-CM

## 2024-07-09 RX ORDER — ATORVASTATIN CALCIUM 40 MG/1
40 TABLET, FILM COATED ORAL DAILY
Qty: 90 TABLET | Refills: 1 | Status: SHIPPED | OUTPATIENT
Start: 2024-07-09 | End: 2024-07-11 | Stop reason: SDUPTHER

## 2024-07-11 DIAGNOSIS — G45.9 TIA (TRANSIENT ISCHEMIC ATTACK): ICD-10-CM

## 2024-07-11 DIAGNOSIS — Z00.00 ROUTINE HEALTH MAINTENANCE: ICD-10-CM

## 2024-07-11 RX ORDER — ATORVASTATIN CALCIUM 40 MG/1
40 TABLET, FILM COATED ORAL DAILY
Qty: 90 TABLET | Refills: 3 | Status: SHIPPED | OUTPATIENT
Start: 2024-07-11

## 2024-07-15 ENCOUNTER — LAB (OUTPATIENT)
Dept: LAB | Facility: LAB | Age: 79
End: 2024-07-15
Payer: MEDICARE

## 2024-07-15 DIAGNOSIS — Z00.00 ROUTINE HEALTH MAINTENANCE: ICD-10-CM

## 2024-07-15 LAB
25(OH)D3 SERPL-MCNC: 54 NG/ML (ref 30–100)
ALBUMIN SERPL BCP-MCNC: 4.4 G/DL (ref 3.4–5)
ALP SERPL-CCNC: 90 U/L (ref 33–136)
ALT SERPL W P-5'-P-CCNC: 32 U/L (ref 7–45)
ANION GAP SERPL CALC-SCNC: 14 MMOL/L (ref 10–20)
APPEARANCE UR: CLEAR
AST SERPL W P-5'-P-CCNC: 24 U/L (ref 9–39)
BASOPHILS # BLD AUTO: 0.02 X10*3/UL (ref 0–0.1)
BASOPHILS NFR BLD AUTO: 0.4 %
BILIRUB SERPL-MCNC: 0.9 MG/DL (ref 0–1.2)
BILIRUB UR STRIP.AUTO-MCNC: NEGATIVE MG/DL
BUN SERPL-MCNC: 24 MG/DL (ref 6–23)
CALCIUM SERPL-MCNC: 9.2 MG/DL (ref 8.6–10.3)
CHLORIDE SERPL-SCNC: 103 MMOL/L (ref 98–107)
CHOLEST SERPL-MCNC: 175 MG/DL (ref 0–199)
CHOLESTEROL/HDL RATIO: 2.2
CO2 SERPL-SCNC: 27 MMOL/L (ref 21–32)
COLOR UR: YELLOW
CREAT SERPL-MCNC: 0.67 MG/DL (ref 0.5–1.05)
CRP SERPL-MCNC: <0.1 MG/DL
EGFRCR SERPLBLD CKD-EPI 2021: 89 ML/MIN/1.73M*2
EOSINOPHIL # BLD AUTO: 0.12 X10*3/UL (ref 0–0.4)
EOSINOPHIL NFR BLD AUTO: 2.3 %
ERYTHROCYTE [DISTWIDTH] IN BLOOD BY AUTOMATED COUNT: 11.8 % (ref 11.5–14.5)
EST. AVERAGE GLUCOSE BLD GHB EST-MCNC: 120 MG/DL
GLUCOSE SERPL-MCNC: 97 MG/DL (ref 74–99)
GLUCOSE UR STRIP.AUTO-MCNC: NORMAL MG/DL
HBA1C MFR BLD: 5.8 %
HCT VFR BLD AUTO: 41.7 % (ref 36–46)
HDLC SERPL-MCNC: 79.5 MG/DL
HGB BLD-MCNC: 14.1 G/DL (ref 12–16)
IMM GRANULOCYTES # BLD AUTO: 0.01 X10*3/UL (ref 0–0.5)
IMM GRANULOCYTES NFR BLD AUTO: 0.2 % (ref 0–0.9)
KETONES UR STRIP.AUTO-MCNC: NEGATIVE MG/DL
LDLC SERPL CALC-MCNC: 80 MG/DL
LEUKOCYTE ESTERASE UR QL STRIP.AUTO: ABNORMAL
LYMPHOCYTES # BLD AUTO: 1.05 X10*3/UL (ref 0.8–3)
LYMPHOCYTES NFR BLD AUTO: 19.8 %
MCH RBC QN AUTO: 31.8 PG (ref 26–34)
MCHC RBC AUTO-ENTMCNC: 33.8 G/DL (ref 32–36)
MCV RBC AUTO: 94 FL (ref 80–100)
MONOCYTES # BLD AUTO: 0.32 X10*3/UL (ref 0.05–0.8)
MONOCYTES NFR BLD AUTO: 6 %
MUCOUS THREADS #/AREA URNS AUTO: ABNORMAL /LPF
NEUTROPHILS # BLD AUTO: 3.79 X10*3/UL (ref 1.6–5.5)
NEUTROPHILS NFR BLD AUTO: 71.3 %
NITRITE UR QL STRIP.AUTO: NEGATIVE
NON HDL CHOLESTEROL: 96 MG/DL (ref 0–149)
NRBC BLD-RTO: 0 /100 WBCS (ref 0–0)
PH UR STRIP.AUTO: 5.5 [PH]
PLATELET # BLD AUTO: 182 X10*3/UL (ref 150–450)
POTASSIUM SERPL-SCNC: 4 MMOL/L (ref 3.5–5.3)
PROT SERPL-MCNC: 6.2 G/DL (ref 6.4–8.2)
PROT UR STRIP.AUTO-MCNC: ABNORMAL MG/DL
RBC # BLD AUTO: 4.43 X10*6/UL (ref 4–5.2)
RBC # UR STRIP.AUTO: ABNORMAL /UL
RBC #/AREA URNS AUTO: ABNORMAL /HPF
SODIUM SERPL-SCNC: 140 MMOL/L (ref 136–145)
SP GR UR STRIP.AUTO: 1.03
TRIGL SERPL-MCNC: 79 MG/DL (ref 0–149)
TSH SERPL-ACNC: 1.93 MIU/L (ref 0.44–3.98)
UROBILINOGEN UR STRIP.AUTO-MCNC: NORMAL MG/DL
VLDL: 16 MG/DL (ref 0–40)
WBC # BLD AUTO: 5.3 X10*3/UL (ref 4.4–11.3)
WBC #/AREA URNS AUTO: ABNORMAL /HPF

## 2024-07-15 PROCEDURE — 80053 COMPREHEN METABOLIC PANEL: CPT

## 2024-07-15 PROCEDURE — 81001 URINALYSIS AUTO W/SCOPE: CPT

## 2024-07-15 PROCEDURE — 82306 VITAMIN D 25 HYDROXY: CPT

## 2024-07-15 PROCEDURE — 86140 C-REACTIVE PROTEIN: CPT

## 2024-07-15 PROCEDURE — 83036 HEMOGLOBIN GLYCOSYLATED A1C: CPT

## 2024-07-15 PROCEDURE — 84443 ASSAY THYROID STIM HORMONE: CPT

## 2024-07-15 PROCEDURE — 36415 COLL VENOUS BLD VENIPUNCTURE: CPT

## 2024-07-15 PROCEDURE — 80061 LIPID PANEL: CPT

## 2024-07-15 PROCEDURE — 85025 COMPLETE CBC W/AUTO DIFF WBC: CPT

## 2024-07-15 NOTE — PROGRESS NOTES
Physical Exam    Name Aneta Byers    Date of Service :7/16/2024      Aneta Byers  79 y.o. is here for an annual physical exam  Hypertension  Hypercholesteremia  Anxiety with some panic though currently well controlled with Lexapro and very rare lorazepam  DJD she had knee replacement in January and really has done quite well  incidental finding of a thyroid nodule and has seen Dr. Amos  and plan is for surveillance  las t seen 7/6   Incidental finding of hypercalcemia and I believe endocrinology ?  Of primary versus secondary hyperthyroidism hormone vitamin D bone density which looked pretty normal.  But because of this was placed on risedronate T-score -1.8 in hip she has follow-up with endocrinology in the near future has just been on risedronate over the last year or so last bone density was 2023  Last colonoscopy 2023    no further indicated   now plan is to wait as just had knee replacement in January  She has had chronic issues with constipation but feels that it is even a little worse than it had been when she takes MiraLAX she sometimes feels poorly  She feels the fiber content in her diet is actually pretty good.  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    Last Dexa 2023 with worse T score -1.8 hip  on Actonel with Dr Meléndez  Up to date with all immunizations including RSV    Past Medical History:   Diagnosis Date    Constipation     UZMA (generalized anxiety disorder)     GERD (gastroesophageal reflux disease)     Hiatal hernia     Panic disorder (episodic paroxysmal anxiety)     Peroneal nerve palsy     Personal history of colonic polyps     Trigger finger, right  "middle finger     Vasovagal syncope     Venous insufficiency of both lower extremities     Vertigo        Past Surgical History:   Procedure Laterality Date    COLONOSCOPY      DILATION AND CURETTAGE OF UTERUS      OVARIAN CYST REMOVAL      VAGINAL MASS EXCISION          Social History     Tobacco Use    Smoking status: Never    Smokeless tobacco: Never   Vaping Use    Vaping status: Never Used   Substance Use Topics    Alcohol use: Not Currently    Drug use: Never        Social History     Social History Narrative    She is originally from the [place] area    She is   for 57     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    Diet is healthy    Exercises routinely    Not a smoker    She has a son and a daughter with 5 grandchildren all who live in the [facility]    Alcohol  rare        Family History   Problem Relation Name Age of Onset    Heart failure Mother           at 97    Leukemia Mother      Other (CLL) Mother      Heart failure Father          dies 93    Dementia Father      Breast cancer Maternal Grandmother      Heart attack Maternal Grandfather      Colon cancer Paternal Grandmother      Breast cancer Other grandmother     No Known Problems Sister          /70   Ht 1.575 m (5' 2\")   Wt 52.2 kg (115 lb)   BMI 21.03 kg/m²     Physical Exam  Physical examination  Reveals a well-developed woman in no acute distress    appearance is age-appropriate she is thin  HEENT exam  Extraocular motion is intact  Tympanic membranes and external auditory canals are normal  Oropharynx is normal  There is no cervical lymphadenopathy appreciated  The thyroid is within normal limits    Lungs    clear to auscultation and percussion    Cardiovascular   regular rate and rhythm  No murmurs rubs or gallops are appreciated    Breast examination   No dominant masses nipple " "discharge or axillary lymphadenopathy is appreciated    Abdomen   soft nontender bowel sounds are positive   there is no organomegaly noted      Periphery  Pulses are present without deficits noted  No peripheral edema is noted    Musculoskeletal  Scarspresent on right knee from knee replacement  Gait is normal  Is no joint erythema or swelling noted  Range of motion is within normal limits  Strength is 5 of 5 without deficits noted    Dermatology  No concerning skin lesions are noted    Neurology  No deficits are noted  Judgment appears appropriate  Mood and affect are appropriate     Results from last 7 days   Lab Units 07/15/24  1053   WBC AUTO x10*3/uL 5.3   HEMOGLOBIN g/dL 14.1   HEMATOCRIT % 41.7   PLATELETS AUTO x10*3/uL 182   NEUTROS PCT AUTO % 71.3   LYMPHS PCT AUTO % 19.8   MONOS PCT AUTO % 6.0   EOS PCT AUTO % 2.3      Results from last 7 days   Lab Units 07/15/24  1053   HEMOGLOBIN A1C % 5.8*     Results from last 7 days   Lab Units 07/15/24  1053   SODIUM mmol/L 140   POTASSIUM mmol/L 4.0   CHLORIDE mmol/L 103   CO2 mmol/L 27   BUN mg/dL 24*   CREATININE mg/dL 0.67   CALCIUM mg/dL 9.2   PROTEIN TOTAL g/dL 6.2*   BILIRUBIN TOTAL mg/dL 0.9   ALK PHOS U/L 90   ALT U/L 32   AST U/L 24   GLUCOSE mg/dL 97      Results from last 7 days   Lab Units 07/15/24  1053   CHOLESTEROL mg/dL 175   TRIGLYCERIDES mg/dL 79   HDL mg/dL 79.5      Results from last 7 days   Lab Units 07/15/24  1053   CRP mg/dL <0.10      Results from last 7 days   Lab Units 07/15/24  1053   TSH mIU/L 1.93     Results from last 7 days   Lab Units 07/15/24  1055   PROTEIN U mg/dL 10 (TRACE)     No components found for: \"UA\"  Lab on 07/15/2024   Component Date Value Ref Range Status    Cholesterol 07/15/2024 175  0 - 199 mg/dL Final          Age      Desirable   Borderline High   High     0-19 Y     0 - 169       170 - 199     >/= 200    20-24 Y     0 - 189       190 - 224     >/= 225         >24 Y     0 - 199       200 - 239     >/= 240   **All " ranges are based on fasting samples. Specific   therapeutic targets will vary based on patient-specific   cardiac risk.    Pediatric guidelines reference:Pediatrics 2011, 128(S5).Adult guidelines reference: NCEP ATPIII Guidelines,ROGER 2001, 258:2486-97    Venipuncture immediately after or during the administration of Metamizole may lead to falsely low results. Testing should be performed immediately prior to Metamizole dosing.    HDL-Cholesterol 07/15/2024 79.5  mg/dL Final      Age       Very Low   Low     Normal    High    0-19 Y    < 35      < 40     40-45     ----  20-24 Y    ----     < 40      >45      ----        >24 Y      ----     < 40     40-60      >60      Cholesterol/HDL Ratio 07/15/2024 2.2   Final      Ref Values  Desirable  < 3.4  High Risk  > 5.0    LDL Calculated 07/15/2024 80  <=99 mg/dL Final                                Near   Borderline      AGE      Desirable  Optimal    High     High     Very High     0-19 Y     0 - 109     ---    110-129   >/= 130     ----    20-24 Y     0 - 119     ---    120-159   >/= 160     ----      >24 Y     0 -  99   100-129  130-159   160-189     >/=190      VLDL 07/15/2024 16  0 - 40 mg/dL Final    Triglycerides 07/15/2024 79  0 - 149 mg/dL Final       Age         Desirable   Borderline High   High     Very High   0 D-90 D    19 - 174         ----         ----        ----  91 D- 9 Y     0 -  74        75 -  99     >/= 100      ----    10-19 Y     0 -  89        90 - 129     >/= 130      ----    20-24 Y     0 - 114       115 - 149     >/= 150      ----         >24 Y     0 - 149       150 - 199    200- 499    >/= 500    Venipuncture immediately after or during the administration of Metamizole may lead to falsely low results. Testing should be performed immediately prior to Metamizole dosing.    Non HDL Cholesterol 07/15/2024 96  0 - 149 mg/dL Final          Age       Desirable   Borderline High   High     Very High     0-19 Y     0 - 119       120 - 144     >/= 145     >/= 160    20-24 Y     0 - 149       150 - 189     >/= 190      ----         >24 Y    30 mg/dL above LDL Cholesterol goal      Glucose 07/15/2024 97  74 - 99 mg/dL Final    Sodium 07/15/2024 140  136 - 145 mmol/L Final    Potassium 07/15/2024 4.0  3.5 - 5.3 mmol/L Final    Chloride 07/15/2024 103  98 - 107 mmol/L Final    Bicarbonate 07/15/2024 27  21 - 32 mmol/L Final    Anion Gap 07/15/2024 14  10 - 20 mmol/L Final    Urea Nitrogen 07/15/2024 24 (H)  6 - 23 mg/dL Final    Creatinine 07/15/2024 0.67  0.50 - 1.05 mg/dL Final    eGFR 07/15/2024 89  >60 mL/min/1.73m*2 Final    Calculations of estimated GFR are performed using the 2021 CKD-EPI Study Refit equation without the race variable for the IDMS-Traceable creatinine methods.  https://jasn.asnjournals.org/content/early/2021/09/22/ASN.2665350904    Calcium 07/15/2024 9.2  8.6 - 10.3 mg/dL Final    Albumin 07/15/2024 4.4  3.4 - 5.0 g/dL Final    Alkaline Phosphatase 07/15/2024 90  33 - 136 U/L Final    Total Protein 07/15/2024 6.2 (L)  6.4 - 8.2 g/dL Final    AST 07/15/2024 24  9 - 39 U/L Final    Bilirubin, Total 07/15/2024 0.9  0.0 - 1.2 mg/dL Final    ALT 07/15/2024 32  7 - 45 U/L Final    Patients treated with Sulfasalazine may generate falsely decreased results for ALT.    Thyroid Stimulating Hormone 07/15/2024 1.93  0.44 - 3.98 mIU/L Final    Color, Urine 07/15/2024 Yellow  Light-Yellow, Yellow, Dark-Yellow Final    Appearance, Urine 07/15/2024 Clear  Clear Final    Specific Gravity, Urine 07/15/2024 1.028  1.005 - 1.035 Final    pH, Urine 07/15/2024 5.5  5.0, 5.5, 6.0, 6.5, 7.0, 7.5, 8.0 Final    Protein, Urine 07/15/2024 10 (TRACE)  NEGATIVE, 10 (TRACE), 20 (TRACE) mg/dL Final    Glucose, Urine 07/15/2024 Normal  Normal mg/dL Final    Blood, Urine 07/15/2024 0.06 (1+) (A)  NEGATIVE Final    Ketones, Urine 07/15/2024 NEGATIVE  NEGATIVE mg/dL Final    Bilirubin, Urine 07/15/2024 NEGATIVE  NEGATIVE Final    Urobilinogen, Urine 07/15/2024 Normal  Normal mg/dL  Final    Nitrite, Urine 07/15/2024 NEGATIVE  NEGATIVE Final    Leukocyte Esterase, Urine 07/15/2024 25 Demar/µL (A)  NEGATIVE Final    Vitamin D, 25-Hydroxy, Total 07/15/2024 54  30 - 100 ng/mL Final    C-Reactive Protein 07/15/2024 <0.10  <1.00 mg/dL Final    Hemoglobin A1C 07/15/2024 5.8 (H)  see below % Final    Estimated Average Glucose 07/15/2024 120  Not Established mg/dL Final    WBC 07/15/2024 5.3  4.4 - 11.3 x10*3/uL Final    nRBC 07/15/2024 0.0  0.0 - 0.0 /100 WBCs Final    RBC 07/15/2024 4.43  4.00 - 5.20 x10*6/uL Final    Hemoglobin 07/15/2024 14.1  12.0 - 16.0 g/dL Final    Hematocrit 07/15/2024 41.7  36.0 - 46.0 % Final    MCV 07/15/2024 94  80 - 100 fL Final    MCH 07/15/2024 31.8  26.0 - 34.0 pg Final    MCHC 07/15/2024 33.8  32.0 - 36.0 g/dL Final    RDW 07/15/2024 11.8  11.5 - 14.5 % Final    Platelets 07/15/2024 182  150 - 450 x10*3/uL Final    Neutrophils % 07/15/2024 71.3  40.0 - 80.0 % Final    Immature Granulocytes %, Automated 07/15/2024 0.2  0.0 - 0.9 % Final    Immature Granulocyte Count (IG) includes promyelocytes, myelocytes and metamyelocytes but does not include bands. Percent differential counts (%) should be interpreted in the context of the absolute cell counts (cells/UL).    Lymphocytes % 07/15/2024 19.8  13.0 - 44.0 % Final    Monocytes % 07/15/2024 6.0  2.0 - 10.0 % Final    Eosinophils % 07/15/2024 2.3  0.0 - 6.0 % Final    Basophils % 07/15/2024 0.4  0.0 - 2.0 % Final    Neutrophils Absolute 07/15/2024 3.79  1.60 - 5.50 x10*3/uL Final    Percent differential counts (%) should be interpreted in the context of the absolute cell counts (cells/uL).    Immature Granulocytes Absolute, Au* 07/15/2024 0.01  0.00 - 0.50 x10*3/uL Final    Lymphocytes Absolute 07/15/2024 1.05  0.80 - 3.00 x10*3/uL Final    Monocytes Absolute 07/15/2024 0.32  0.05 - 0.80 x10*3/uL Final    Eosinophils Absolute 07/15/2024 0.12  0.00 - 0.40 x10*3/uL Final    Basophils Absolute 07/15/2024 0.02  0.00 - 0.10  x10*3/uL Final    WBC, Urine 07/15/2024 1-5  1-5, NONE /HPF Final    RBC, Urine 07/15/2024 11-20 (A)  NONE, 1-2, 3-5 /HPF Final    Mucus, Urine 07/15/2024 1+  Reference range not established. /LPF Final     [unfilled]   No results found.   The 10-year ASCVD risk score (Kylah DONOVAN, et al., 2019) is: 34.8%    Values used to calculate the score:      Age: 79 years      Sex: Female      Is Non- : No      Diabetic: No      Tobacco smoker: No      Systolic Blood Pressure: 132 mmHg      Is BP treated: Yes      HDL Cholesterol: 79.5 mg/dL      Total Cholesterol: 175 mg/dL   .  .     Problem List Items Addressed This Visit       Benign essential hypertension - Primary    Mixed hyperlipidemia     Other Visit Diagnoses       Gastroesophageal reflux disease, unspecified whether esophagitis present        Relevant Medications    famotidine (Pepcid) 20 mg tablet            Assessment/Plan   #1 hypertension blood pressure is in excellent control continue current regimen  2.  Hypercholesteremia cholesterol is also very good  3.  Question of TIA has seen neurology plan is tight blood pressure control as well as baby aspirin has seen   #4 anxiety no real rhyme or reason that she can figure out perhaps every other week she does feel that some of this anxiety is secondary to decisions that were made when she was in her 40s regarding   5.  Osteopenia bone density -1.8 last year but question of hyperparathyroidism she is seeing endocrinology at this point remains on risedronate bone density next year and can reevaluate but she will discuss with her endocrinologist as well  6.  Thyroid nodule has been stable has follow-up in near future  #7 constipation chronic in nature not taking the MiraLAX just stool softener certainly can take MiraLAX on a as needed basis we have discussed using magnesium and would recommend magnesium glycinate 200 to 400 mg at bedtime which should help with relaxation as well  as the constipation  8.  Health maintenance  Congratulated her on healthy lifestyle  Continue routine regular exercise  Up-to-date with immunizations including RSV recommend COVID vaccination in fall  Does not require further colonoscopy  Mammogram August does still see GYN as well  9.  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  Urinary urgency discussed core strengthening pelvic floor perhaps even pelvic floor therapy at this point not really interested does not feels bad enough to consider medication either

## 2024-07-16 ENCOUNTER — APPOINTMENT (OUTPATIENT)
Dept: PRIMARY CARE | Facility: CLINIC | Age: 79
End: 2024-07-16
Payer: MEDICARE

## 2024-07-16 ENCOUNTER — OFFICE VISIT (OUTPATIENT)
Dept: PRIMARY CARE | Facility: CLINIC | Age: 79
End: 2024-07-16
Payer: MEDICARE

## 2024-07-16 VITALS
HEIGHT: 62 IN | DIASTOLIC BLOOD PRESSURE: 70 MMHG | WEIGHT: 115 LBS | BODY MASS INDEX: 21.16 KG/M2 | SYSTOLIC BLOOD PRESSURE: 132 MMHG

## 2024-07-16 VITALS
WEIGHT: 115 LBS | OXYGEN SATURATION: 98 % | BODY MASS INDEX: 21.16 KG/M2 | HEART RATE: 63 BPM | DIASTOLIC BLOOD PRESSURE: 60 MMHG | HEIGHT: 62 IN | SYSTOLIC BLOOD PRESSURE: 120 MMHG

## 2024-07-16 DIAGNOSIS — K21.9 GASTROESOPHAGEAL REFLUX DISEASE, UNSPECIFIED WHETHER ESOPHAGITIS PRESENT: ICD-10-CM

## 2024-07-16 DIAGNOSIS — I10 BENIGN ESSENTIAL HYPERTENSION: Primary | ICD-10-CM

## 2024-07-16 DIAGNOSIS — E78.2 MIXED HYPERLIPIDEMIA: ICD-10-CM

## 2024-07-16 RX ORDER — FAMOTIDINE 20 MG/1
20 TABLET, FILM COATED ORAL NIGHTLY
Qty: 60 TABLET | Refills: 2 | Status: SHIPPED | OUTPATIENT
Start: 2024-07-16 | End: 2025-07-16

## 2024-07-16 ASSESSMENT — ENCOUNTER SYMPTOMS
DEPRESSION: 0
LOSS OF SENSATION IN FEET: 0
OCCASIONAL FEELINGS OF UNSTEADINESS: 0

## 2024-07-16 ASSESSMENT — PATIENT HEALTH QUESTIONNAIRE - PHQ9
1. LITTLE INTEREST OR PLEASURE IN DOING THINGS: NOT AT ALL
2. FEELING DOWN, DEPRESSED OR HOPELESS: NOT AT ALL
SUM OF ALL RESPONSES TO PHQ9 QUESTIONS 1 AND 2: 0

## 2024-07-16 ASSESSMENT — ACTIVITIES OF DAILY LIVING (ADL)
GROCERY_SHOPPING: INDEPENDENT
TAKING_MEDICATION: INDEPENDENT
DOING_HOUSEWORK: INDEPENDENT
MANAGING_FINANCES: INDEPENDENT

## 2024-07-16 NOTE — PATIENT INSTRUCTIONS
It was good to see you.  You are doing a good job with your overall health care.    hemoglobin A1c is slightly elevated at 5.8 but has been stable.  Continue to watch carbohydrate intake  Remainder of blood work is all acceptable  You will be due for mammogram in August  Plan to repeat bone density in 2025 as it has been very stable  discuss need for Actonel   Covid vaccination in fall with flu     This burning sensation magnesium glycinate and had to get back and like that to get to less than okay so you 200-400 mg at night for relaxation and may help the constipation   too (magnesium complex on [employer])  Follow a brain healthy lifestyle, which includes  Controlling medical conditions such as diabetes, high blood pressure, high cholesterol, thyroid disease, sleep apnea , depression and anxiety  Use eyeglasses or hearing aids appropriately if indicated  Eat a heart healthy diet such as a Mediterranean /low-fat diet with abundant fresh fruits and vegetables as well as fish  Get adequate and routine regular exercise  Maintain good sleep hygiene avoiding daytime naps and trying to get 7 to 8 hours of uninterrupted sleep at nighttime  Stay mentally active which may include puzzles, word searches, reading, computer games, playing cards or board games  Stay socially active and engaged as well

## 2024-07-16 NOTE — PROGRESS NOTES
Chief Complaint: Medicare Wellness Exam/Comprehensive Problem Focused Follow Up and Physical Exam    HPI:    hypertension  Hypercholesteremia  Anxiety with some panic though currently well controlled with Lexapro and very rare lorazepam  DJD she had knee replacement in January and really has done quite well  incidental finding of a thyroid nodule and has seen Dr. Amos  and plan is for surveillance  las t seen 7/6   Incidental finding of hypercalcemia and I believe endocrinology ?  Of primary versus secondary hyperthyroidism hormone vitamin D bone density which looked pretty normal.  But because of this was placed on risedronate T-score -1.8 in hip she has follow-up with endocrinology in the near future has just been on risedronate over the last year or so last bone density was 2023  Last colonoscopy 2023    no further indicated   now plan is to wait as just had knee replacement in January  She has had chronic issues with constipation but feels that it is even a little worse than it had been when she takes MiraLAX she sometimes feels poorly  She feels the fiber content in her diet is actually pretty good.  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 as well     Last Dexa 2023 with worse T score -1.8 hip  on Actonel with Dr Meléndez  Up to date with all immunizations including RSV            Active Problem List      Comprehensive Medical/Surgical/Social/Family History  Past Medical History:   Diagnosis Date    Constipation     UZMA (generalized anxiety disorder)     GERD (gastroesophageal reflux disease)     Hiatal hernia     Panic disorder (episodic paroxysmal anxiety)     Peroneal nerve palsy      Personal history of colonic polyps     Trigger finger, right middle finger     Vasovagal syncope     Venous insufficiency of both lower extremities     Vertigo      Past Surgical History:   Procedure Laterality Date    COLONOSCOPY      DILATION AND CURETTAGE OF UTERUS      OVARIAN CYST REMOVAL      VAGINAL MASS EXCISION       Social History     Social History Narrative    She is originally from the [place] area    She is   for 57     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 90 tablet 3    atorvastatin (Lipitor) 40 mg tablet Take 1 tablet (40 mg) by mouth once daily. 90 tablet 3    cholecalciferol (Vitamin D-3) 50 mcg (2,000 unit) capsule Take 1 capsule (50 mcg) by mouth once daily.      cyanocobalamin (Vitamin B-12) 100 mcg tablet Take by mouth.      escitalopram (Lexapro) 10 mg tablet Take 1 tablet (10 mg) by mouth once daily. 90 tablet 3    risedronate (Actonel) 150 mg tablet PLEASE SEE ATTACHED FOR DETAILED DIRECTIONS      famotidine (Pepcid) 20 mg tablet Take 1 tablet (20 mg) by mouth once daily at bedtime. 60 tablet 2    omeprazole (PriLOSEC) 20 mg DR capsule Take 1 capsule (20 mg) by mouth 2 times a day. Do not crush or chew. 60 capsule 2    [DISCONTINUED] atorvastatin (Lipitor) 40 mg tablet TAKE 1 TABLET BY MOUTH EVERY DAY 90 tablet 1    [DISCONTINUED] famotidine (Pepcid) 20 mg tablet Take 1 tablet (20 mg) by mouth once daily at bedtime. 60 tablet 2     No current  "facility-administered medications on file prior to visit.       Medications and Supplements  prescribed by me and other practitioners or clinical pharmacist (such as prescriptions, OTC's, herbal therapies and supplements) were reviewed and documented in the medical record.    Tobacco/Alcohol/Opioid use, as well as Illicit Drug Use was screened for/reviewed and documented in Social History section and medication list as appropriate  Activities of Daily Living  In your present state of health, do you have any difficulty performing the following activities?:   Preparing food and eating?: No  Bathing yourself: No  Getting dressed: No  Using the toilet:No  Moving around from place to place: No  In the past year have you fallen or had a near fall?:No    Depression Screen  (Note: if answer to either of the following is \"Yes\", then a more complete depression screening is indicated)   Q1: Over the past two weeks, have you felt down, depressed or hopeless? No  Q2: Over the past two weeks, have you felt little interest or pleasure in doing things? No    Current exercise habits: Exercises routinely and pretty much daily combination cardio weights and balance training  Dietary issues discussed: Yes  Hearing difficulties: Yes  Safe in current home environment: yes  Visual Acuity assessed: no  Cognitive Impairment assessed: yes   MoCA  29/30 missing only 1 recall item    Advance directives  Advanced Care Planning (including a Living Will, Healthcare POA, as well as specific end of life choices and/or directives), was discussed f  Cardiac Risk Assessment  Cardiovascular risk was discussed and, if needed, lifestyle modifications recommended, including nutritional choices, exercise, and elimination of habits contributing to risk. We agreed on a plan to reduce the current cardiovascular risk based on above discussion as needed.  Aspirin use/disuse was discussed after reviewing the updated guidelines below:    Consider low dose Aspirin " "( mg) use if the benefit for cardiovascular disease prevention outweighs risk for bleeding complications.   In general, low dose ASA should be considered:  In patients WITHOUT prior MI/stroke/PAD (primary prevention):   a. Age <60: Use if 10-year cardiovascular disease risk >20%, with discussion of risks and benefits with patient  b. Age 60-<70: Use if 10-year cardiovascular disease risk >20% and low bleeding (e.g., gastrointenstinal) risk, with discussion of risks and benefits with patient  c. Age >=70: Do not use    In patients WITH prior MI/stroke/PAD (secondary prevention):   Generally use unless extremely high bleeding (e.g., gastrointenstinal) risk, with discussion of risks and benefits with patient    ROS otherwise negative aside from what was mentioned above in HPI.    Vitals  /60   Pulse 63   Ht 1.575 m (5' 2\")   Wt 52.2 kg (115 lb)   SpO2 98%   BMI 21.03 kg/m²   Body mass index is 21.03 kg/m².  Physical Exam  Gen: Alert, NAD  HEENT:  PERRLA, EOMI, conjunctiva and sclera normal in appearance. External auditory canals/TMs normal; Oral cavity and posterior pharynx without lesions/exudate  Neck:  Supple with FROM; No masses/nodes palpable; Thyroid nontender and without nodules; No JÚNIOR  Respiratory:  Lungs CTAB  Cardiovascular:  Heart RRR. No M/R/G. Peripheral pulses equal bilaterally  Abdomen:  Soft, nontender, BS present throughout; No R/G/R; No HSM or masses palpated  Extremities:  FROM all extremities; Muscle strength grossly normal with good tone  Neuro:  CN II-XII intact; Reflexes 2+/2+; Gross motor and sensory intact  Skin:  No suspicious lesions present  Breast: No masses, skin lesions or nipple discharges, no axillary lymphadenopathy    Assessment and Plan:  Problem List Items Addressed This Visit    None  #1 hypertension blood pressure is in excellent control continue current regimen  2.  Hypercholesteremia cholesterol is also very good  3.  Question of TIA has seen neurology plan is " tight blood pressure control as well as baby aspirin has seen   #4 anxiety no real rhyme or reason that she can figure out perhaps every other week she does feel that some of this anxiety is secondary to decisions that were made when she was in her 40s regarding   5.  Osteopenia bone density -1.8 last year but question of hyperparathyroidism she is seeing endocrinology at this point remains on risedronate bone density next year and can reevaluate but she will discuss with her endocrinologist as well  6.  Thyroid nodule has been stable has follow-up in near future  #7 constipation chronic in nature not taking the MiraLAX just stool softener certainly can take MiraLAX on a as needed basis we have discussed using magnesium and would recommend magnesium glycinate 200 to 400 mg at bedtime which should help with relaxation as well as the constipation  8.  Health maintenance  Congratulated her on healthy lifestyle  Continue routine regular exercise  Up-to-date with immunizations including RSV recommend COVID vaccination in fall  Does not require further colonoscopy  Mammogram August does still see GYN as well  9.  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  Urinary urgency discussed core strengthening pelvic floor perhaps even pelvic floor therapy at this point not really interested does not feels bad enough to consider medication either        During the course of the visit the patient was educated and counseled about age appropriate screening and preventive services. Completed preventive screenings were documented in the chart and orders were placed for outstanding screenings/procedures as documented in the Assessment and Plan.      Patient Instructions (the written plan) was given to the patient at check out.      Zakiya Khan MD

## 2024-08-08 ENCOUNTER — APPOINTMENT (OUTPATIENT)
Dept: AUDIOLOGY | Facility: CLINIC | Age: 79
End: 2024-08-08
Payer: MEDICARE

## 2024-08-10 ENCOUNTER — APPOINTMENT (OUTPATIENT)
Dept: AUDIOLOGY | Facility: CLINIC | Age: 79
End: 2024-08-10
Payer: MEDICARE

## 2024-09-10 ENCOUNTER — APPOINTMENT (OUTPATIENT)
Dept: AUDIOLOGY | Facility: CLINIC | Age: 79
End: 2024-09-10
Payer: MEDICARE

## 2024-09-10 DIAGNOSIS — H90.3 SENSORINEURAL HEARING LOSS (SNHL) OF BOTH EARS: Primary | ICD-10-CM

## 2024-09-10 NOTE — PROGRESS NOTES
HEARING AID CHECK    Name:  Aneta Byers  :  1945  Age:  79 y.o.    HEARING AID INFORMATION:    Right Hearing Aid  Oticon Intent 3 miniRITE-R  SN: [id number]  Warranty: 2027  Left Hearing Aid  Oticon Intent 3 miniRITE-R  SN: [id number]  Warranty: 2027    SN: [id number]  Extra : [id number]   Length   Right: 2(60)  Left: 2(85)  Dome/Earmold  Right: 5 mm open bass  Left: 5 mm open bass  Wax Filter  Prowax miniFit  Battery Size  Rechargeable  TLC Expiration:  2026      HISTORY:    Patient was seen for check of the above devices.  She reported doing fairly well with her hearing aids, but she does still notice reverberation with her own voice.    EXAM/PROCEDURES:    Visual inspection revealed minimal cerumen accumulation on the domes, with clear wax guards.  Replaced wax filters and domes.     Connected hearing aids to fitting software.  Completed firmware update.  Under fitting assistant, reduced mid high-frequency gain 2 dB.  Patient reported slight improvement.  Also reduced low-frequency gain an additional 2 steps.  Patient reported improvement in sound quality of her own voice.     Reviewed with patient how to use speech boost in the equalizer and her Oticon  mitzi when she is experiencing difficulty in noisy environments.    RECOMMENDATIONS AND FOLLOW-UP:    - Continue use of amplification and follow-up as recommended for hearing aid maintenance and adjustments.  - Recommended 1-month follow-up HAC. Patient to contact the office sooner if problems arise.  - Monitor and recheck hearing as warranted.  - Counseled regarding results and recommendations.      VALENTINA Corcoran.  Audiology Student Extern    Va Hahn  Clinical Audiologist

## 2024-10-08 NOTE — PROGRESS NOTES
HEARING AID CHECK    Name:  Aneta Byers  :  1945  Age:  79 y.o.    HEARING AID INFORMATION:    Right Hearing Aid  Oticon Intent 3 miniRITE-R  SN: [id number]  Warranty: 2027  Left Hearing Aid  Oticon Intent 3 miniRITE-R  SN: [id number]  Warranty: 2027    SN: [id number]  Extra : [id number]   Length   Right: 2(60)  Left: 2(85)  Dome/Earmold  Right: 5 mm open bass  Left: 5 mm open bass  Wax Filter  Prowax miniFit  Battery Size  Rechargeable  TLC Expiration:  2026      HISTORY:    Patient was seen today for 1 month check of the above devices.  She continues to do well with her hearing aids.     Patient brought in with her paperwork for her insurance reimbursement. She needed help with a few questions pertaining to the practice information.    EXAM/PROCEDURES:    Assisted patient in filling out the paperwork with the practice address, phone number, provider NPI number, tax ID number, and procedure codes.  Reviewed instructions on the packet with patient and informed her that she would need to attach a copy of her receipt and itemized statement.  She cannot recall if she still had a copy of these papers.  Copies of her purchase agreement and itemized statement was provided to her.    Patient denied any programming changes at this time.  She did inquire if the hearing aids were properly inserted.  Visual inspection revealed adequate insertion depth.  Visual inspection of the devices revealed no cerumen accumulation.    RECOMMENDATIONS AND FOLLOW-UP:    - Continue use of amplification and follow-up as recommended for hearing aid maintenance and adjustments.  - Recommended 3-month follow-up HAC. Patient to contact the office sooner if problems arise.  - Monitor and recheck hearing as warranted.  - Counseled regarding results and recommendations.      Va Hahn  Clinical Audiologist

## 2024-10-10 ENCOUNTER — APPOINTMENT (OUTPATIENT)
Dept: AUDIOLOGY | Facility: CLINIC | Age: 79
End: 2024-10-10
Payer: MEDICARE

## 2024-10-10 DIAGNOSIS — H90.3 SENSORINEURAL HEARING LOSS (SNHL) OF BOTH EARS: Primary | ICD-10-CM

## 2024-11-14 ENCOUNTER — TELEPHONE (OUTPATIENT)
Facility: CLINIC | Age: 79
End: 2024-11-14
Payer: MEDICARE

## 2024-11-14 DIAGNOSIS — F41.9 ANXIETY: Primary | ICD-10-CM

## 2024-11-14 RX ORDER — MIRTAZAPINE 7.5 MG/1
7.5 TABLET, FILM COATED ORAL NIGHTLY
Qty: 30 TABLET | Refills: 2 | Status: SHIPPED | OUTPATIENT
Start: 2024-11-14 | End: 2025-02-12

## 2024-11-14 NOTE — TELEPHONE ENCOUNTER
She is not eating and has lost weight she is just distraught and wanted to know if there is something you can prescribe her. She knows you are out but if you have a chance can you call her later and check on her and talk to her about options.

## 2024-11-20 DIAGNOSIS — K21.9 GASTROESOPHAGEAL REFLUX DISEASE, UNSPECIFIED WHETHER ESOPHAGITIS PRESENT: ICD-10-CM

## 2024-11-21 RX ORDER — FAMOTIDINE 20 MG/1
20 TABLET, FILM COATED ORAL NIGHTLY
Qty: 90 TABLET | Refills: 1 | Status: SHIPPED | OUTPATIENT
Start: 2024-11-21 | End: 2025-11-21

## 2024-12-06 DIAGNOSIS — F41.9 ANXIETY: ICD-10-CM

## 2024-12-11 RX ORDER — MIRTAZAPINE 7.5 MG/1
7.5 TABLET, FILM COATED ORAL NIGHTLY
Qty: 90 TABLET | Refills: 1 | Status: SHIPPED | OUTPATIENT
Start: 2024-12-11 | End: 2025-06-09

## 2025-01-16 ENCOUNTER — APPOINTMENT (OUTPATIENT)
Dept: AUDIOLOGY | Facility: CLINIC | Age: 80
End: 2025-01-16
Payer: MEDICARE

## 2025-01-16 ENCOUNTER — OFFICE VISIT (OUTPATIENT)
Dept: PRIMARY CARE | Facility: CLINIC | Age: 80
End: 2025-01-16
Payer: MEDICARE

## 2025-01-16 VITALS — OXYGEN SATURATION: 98 % | SYSTOLIC BLOOD PRESSURE: 112 MMHG | DIASTOLIC BLOOD PRESSURE: 62 MMHG | HEART RATE: 79 BPM

## 2025-01-16 DIAGNOSIS — G43.009 ATYPICAL MIGRAINE: Primary | ICD-10-CM

## 2025-01-16 DIAGNOSIS — I10 BENIGN ESSENTIAL HYPERTENSION: ICD-10-CM

## 2025-01-16 DIAGNOSIS — H69.92 DISORDER OF LEFT EUSTACHIAN TUBE: ICD-10-CM

## 2025-01-16 PROCEDURE — 1159F MED LIST DOCD IN RCRD: CPT | Performed by: INTERNAL MEDICINE

## 2025-01-16 PROCEDURE — 99214 OFFICE O/P EST MOD 30 MIN: CPT | Performed by: INTERNAL MEDICINE

## 2025-01-16 PROCEDURE — 1036F TOBACCO NON-USER: CPT | Performed by: INTERNAL MEDICINE

## 2025-01-16 PROCEDURE — 3074F SYST BP LT 130 MM HG: CPT | Performed by: INTERNAL MEDICINE

## 2025-01-16 PROCEDURE — 3078F DIAST BP <80 MM HG: CPT | Performed by: INTERNAL MEDICINE

## 2025-01-16 PROCEDURE — 1160F RVW MEDS BY RX/DR IN RCRD: CPT | Performed by: INTERNAL MEDICINE

## 2025-01-16 NOTE — PATIENT INSTRUCTIONS
It was good to see you.  Your symptoms are pretty classic for an atypical migraine.  We know you have had this in the past and have had a complete evaluation.  At this time I do not think we need to do anything further.  I would be concerned if your symptoms lasted longer or if you have other neurologic findings such as weakness asymmetry of face etc.  Your eardrum looks okay  Try Nasacort nasal spray in hopes that this will help with eustachian tube dysfunction and open the ear up  Lets see you before your trip

## 2025-01-16 NOTE — PROGRESS NOTES
Subjective   Patient ID: Aneta Byers is a 79 y.o. female.    HPI  Patient presents today with concerns   had episode regarding transient difficulty with speech today prior to that she definitely had an aura and then soon after had this brief episode less than 10 minutes where she felt she had difficulty remembering names or words not so much slurred speech  She does get these auras does not usually get headaches note in April 2024 she had an episode that was a little worse with more dysarthria was admitted to the hospital had very complete workup ended up with CT MRIs carotid studies Holter monitor did see vascular neurology Dr Arvizu who felt consistent with migraine basically was placed on aspirin blood pressure control though as well  It has been a very stressful time for she lost her  cardiac arrest very suddenly about 2 months ago so reeling from these effects    Past medical history significant for  Anxiety and panic attacks certain heightened anxiety recently  Hypertension  Hypercholesteremia  DJD  Thyroid nodule following with endocrine  Hypothyroidism  Episode in April 2024 with slurred speech dysarthria has followed with Dr. Arvizu recommendations were for good blood pressure control and baby aspirin daily initially she had been placed on dual antiplatelets agents for 3 months initially and then switched to just aspirin  Instead of TIA felt this really could have represented an atypical migraine also  Had issues with trigeminal/occipital neuralgia    Review of Systems    Objective   Physical Exam  Well-developed age-appropriate no acute distress  HEENT exam is unremarkable funduscopic exam is normal  Cranial nerves II through XII are intact without deficits noted  Reflexes are present symmetric lower extremity reflexes brisk but symmetric  Periphery without edema  Gait normal  No carotid bruits noted  Tympanic membrane on left normal      Assessment/Plan   #1 transient episode with word finding issues  did have an aura prior to this I really think this is most consistent with her atypical migraine history she has had workup in the past I see no neurologic deficits and we have discussed the possibility of imaging and have decided against at this point we did discuss worrisome findings of stroke/TIA and with those symptoms would want her to go to the emergency room  2.  Hypertension good control  3.  Stress anxiety still a lot of stress regarding the sudden death of her  a lot of questions that she just cannot seem to resolve he is now sleeping and eating feels that her social supports are quite good  #4 left ear block consistent with eustachian tube dysfunction discussed trying to use Nasacort  30 minutes were spent with patient of which greater than 50% was spent in counseling and coordination of care  This note was partially generated using the Dragon voice recognition system.  There may be some incorrect wording ,grammar, spelling or punctuation errors that were not corrected prior to committing the note to the medical record.

## 2025-01-28 ENCOUNTER — APPOINTMENT (OUTPATIENT)
Dept: AUDIOLOGY | Facility: CLINIC | Age: 80
End: 2025-01-28
Payer: MEDICARE

## 2025-01-28 DIAGNOSIS — H90.3 SENSORINEURAL HEARING LOSS (SNHL) OF BOTH EARS: Primary | ICD-10-CM

## 2025-01-28 NOTE — PROGRESS NOTES
HEARING AID CHECK    Name:  Aneta Byers  :  1945  Age:  79 y.o.    HEARING AID INFORMATION:    Right Hearing Aid  Oticon Intent 3 miniRITE-R  SN: [id number]  Warranty: 2027  Left Hearing Aid  Oticon Intent 3 miniRITE-R  SN: [id number]  Warranty: 2027    SN: [id number]  Extra : [id number]   Length   Right: 2(60)  Left: 2(85)  Dome/Earmold  Right: 5 mm open  Left: 5 mm open bass  Wax Filter  Prowax miniFit  Battery Size  Rechargeable  TLC Expiration:  2026      HISTORY:    Patient reported that she is not wearing hearing aids consistently due to increased stress from unforeseen family circumstances.  Patient requested to review how to route phone calls to her hearing aids.  Lastly, she reported some soreness in the right ear after several hours of use.    EXAM/PROCEDURES:    Visual inspection revealed no cerumen accumulation. Cleaned and checked hearing aids.  Vacuumed brad ports and  ports. Completed Redux dehumidification treatment for patient's hearing aids where <0.5 uL of moisture was removed during a 3:43 minute cycle. Replaced wax filters and domes. Confirmed no firmware update was needed.  Patient declined any programming changes at this time.    Test call was completed and reviewed routing options with patient's iPhone.    Counseled patient that she currently has the smallest speaker and dome size available.  However, she does currently have the retention locks which could potentially cause some soreness in her ears.  Offered to remove retention locks to see if this resolves soreness.  Patient was agreeable and retention locks were removed.    RECOMMENDATIONS AND FOLLOW-UP:    - Continue use of amplification and follow-up as recommended for hearing aid maintenance and adjustments.  - Recommended 3-month follow-up HAC. Patient to contact the office sooner if problems arise.  - Monitor and recheck hearing as warranted.  - Counseled regarding results  and recommendations.    BRANDEN Johnson., B.S.  Audiology Student Extern    Va Hahn  Clinical Audiologist

## 2025-02-26 ENCOUNTER — TELEPHONE (OUTPATIENT)
Dept: PRIMARY CARE | Facility: CLINIC | Age: 80
End: 2025-02-26
Payer: MEDICARE

## 2025-02-26 NOTE — TELEPHONE ENCOUNTER
She had some bw with Dr. Meléndez and is concerned about the results. He mentioned something about surgery on the My Chart message. I told her to clal the office for a better understanding but she wants you to take a look.  He also said she needs a BMD can you ou the order in?

## 2025-03-11 NOTE — PROGRESS NOTES
Subjective   Patient ID: Aneta Byers is a 79 y.o. female.    HPI  She presents today in follow-up  Her shoulder blade hurts intermittently  right side  usually  intermittently for the past year nor debilitating   Constipation b which is chronic in nature  Occasional gastroesophageal reflux symptoms maybe 1 or 2 times weekly  She continues to really struggle with grief though really has excellent support systems has done some grief counseling does have excellent family and friends support as well she just continue to second-guess if there is anything she could have done differently with her 's sudden death              Past medical history significant for  Anxiety and panic attacks certain heightened anxiety recently  Hypertension  Hypercholesteremia  DJD  Thyroid nodule following with endocrine  Hypothyroidism  Episode in April 2024 with slurred speech dysarthria has followed with Dr. Arvizu recommendations were for good blood pressure control and baby aspirin daily initially she had been placed on dual antiplatelets agents for 3 months initially and then switched to just aspirin  Instead of TIA felt this really could have represented an atypical migraine also  She  did have more minor recent episode as well  Had issues with trigeminal/occipital neuralgia  Mild hypercalcemia as well as mild elevation in PTH waiting for bone density evaluation    Review of Systems    Objective   Physical Exam  Well-developed appears markedly younger than her age in no acute distress  HEENT exam is unremarkable  Lungs are clear to auscultation percussion  Thyroid appears within normal limits  Cardiovascular regular rate and rhythm  Periphery without edema  There is no pain to palpation in the scapular area at this time there is good range of motion of her shoulder  Assessment/Plan   #1 hypertension under excellent control continue current regimen  2.  Chronic constipation we did discuss making sure she drinks enough water it is  fine if she needs to take MiraLAX daily  3.  Thyroid nodules she is following routinely with endocrinology just had ultrasound things have been stable nothing further to do  #4 question TIA versus atypical migraine we have discussed to continue the baby aspirin at this time as this was neurology recommendation  5.  Hypercalcemia with mild hyperparathyroidism she is going to have a repeat bone density done in the very near future we have discussed that if her bone density is stable really nothing further to do if there has been significant decrease in her bone density done could consider parathyroidectomy  6.  Grief this really is her biggest issue at this time she really is trying to use her support systems reassurance was given that there was nothing she could have done her  had a sudden cardiac death while playing tennis.  7.  Health maintenance  She is up-to-date with immunization she is planning to take a trip to [place] with her family last COVID booster was November so she could receive a booster in May but not yet    30 minutes were spent with patient of which greater than 50% was spent in counseling and coordination of care  This note was partially generated using the Dragon voice recognition system.  There may be some incorrect wording ,grammar, spelling or punctuation errors that were not corrected prior to committing the note to the medical record.

## 2025-03-12 ENCOUNTER — APPOINTMENT (OUTPATIENT)
Dept: PRIMARY CARE | Facility: CLINIC | Age: 80
End: 2025-03-12
Payer: MEDICARE

## 2025-03-12 VITALS — DIASTOLIC BLOOD PRESSURE: 60 MMHG | SYSTOLIC BLOOD PRESSURE: 112 MMHG

## 2025-03-12 DIAGNOSIS — K21.9 GASTROESOPHAGEAL REFLUX DISEASE WITHOUT ESOPHAGITIS: ICD-10-CM

## 2025-03-12 DIAGNOSIS — K59.09 CHRONIC CONSTIPATION: ICD-10-CM

## 2025-03-12 DIAGNOSIS — I10 BENIGN ESSENTIAL HYPERTENSION: Primary | ICD-10-CM

## 2025-03-12 DIAGNOSIS — G45.9 TIA (TRANSIENT ISCHEMIC ATTACK): ICD-10-CM

## 2025-03-12 PROCEDURE — 1159F MED LIST DOCD IN RCRD: CPT | Performed by: INTERNAL MEDICINE

## 2025-03-12 PROCEDURE — 1160F RVW MEDS BY RX/DR IN RCRD: CPT | Performed by: INTERNAL MEDICINE

## 2025-03-12 PROCEDURE — 1036F TOBACCO NON-USER: CPT | Performed by: INTERNAL MEDICINE

## 2025-03-12 PROCEDURE — 3074F SYST BP LT 130 MM HG: CPT | Performed by: INTERNAL MEDICINE

## 2025-03-12 PROCEDURE — 99214 OFFICE O/P EST MOD 30 MIN: CPT | Performed by: INTERNAL MEDICINE

## 2025-03-12 PROCEDURE — 3078F DIAST BP <80 MM HG: CPT | Performed by: INTERNAL MEDICINE

## 2025-03-12 RX ORDER — LORAZEPAM 0.5 MG/1
TABLET ORAL
COMMUNITY
Start: 2025-01-08

## 2025-03-12 NOTE — PATIENT INSTRUCTIONS
It was good to see you.  You are doing a good job with your overall health care.   I really do think you are doing very well.  Continue to use all of your support systems as I know you need them  From the standpoint of the baby aspirin recommendation is to continue it indefinitely as we were unsure if that episode you had a year ago was a small TIA  I am not overly concerned with the mild elevated calcium level and parathyroid level as long as her bone density looks okay.  The pain you have in your shoulder blade does sound musculoskeletal and really do not think there is anything further we need to do with this unless it becomes more severe and more frequent  Your blood pressure looks excellent  It is fine to take MiraLAX daily if you need it also be sure you are drinking enough water  It is okay to take the famotidine on as needed basis as well    You are up-to-date with immunizations   Prior to have a fantastic trip with your family!

## 2025-04-03 ENCOUNTER — HOSPITAL ENCOUNTER (OUTPATIENT)
Dept: RADIOLOGY | Facility: CLINIC | Age: 80
Discharge: HOME | End: 2025-04-03
Payer: MEDICARE

## 2025-04-03 DIAGNOSIS — E83.52 HYPERCALCEMIA: ICD-10-CM

## 2025-04-03 PROCEDURE — 77080 DXA BONE DENSITY AXIAL: CPT

## 2025-04-03 PROCEDURE — 77080 DXA BONE DENSITY AXIAL: CPT | Performed by: RADIOLOGY

## 2025-04-27 ENCOUNTER — TELEPHONE (OUTPATIENT)
Dept: PRIMARY CARE | Facility: CLINIC | Age: 80
End: 2025-04-27
Payer: MEDICARE

## 2025-04-27 NOTE — TELEPHONE ENCOUNTER
Spoke with patient she checked her blood pressure today and it was 98 systolic she has been running a bit low she is not a great water drinker she exercises routinely today without any difficulty.  She is going to increase her water intake we will going to decrease her amlodipine to 5 mg daily she will take half of her 10 mg tablet she will check in with me later this week as to her blood pressure readings

## 2025-04-29 ENCOUNTER — APPOINTMENT (OUTPATIENT)
Dept: AUDIOLOGY | Facility: CLINIC | Age: 80
End: 2025-04-29
Payer: MEDICARE

## 2025-05-27 ENCOUNTER — APPOINTMENT (OUTPATIENT)
Dept: AUDIOLOGY | Facility: CLINIC | Age: 80
End: 2025-05-27
Payer: MEDICARE

## 2025-06-12 DIAGNOSIS — I10 BENIGN ESSENTIAL HYPERTENSION: ICD-10-CM

## 2025-06-13 RX ORDER — AMLODIPINE BESYLATE 10 MG/1
10 TABLET ORAL DAILY
Qty: 90 TABLET | Refills: 3 | Status: SHIPPED | OUTPATIENT
Start: 2025-06-13

## 2025-06-17 ENCOUNTER — APPOINTMENT (OUTPATIENT)
Dept: AUDIOLOGY | Facility: CLINIC | Age: 80
End: 2025-06-17
Payer: MEDICARE

## 2025-06-24 ENCOUNTER — APPOINTMENT (OUTPATIENT)
Dept: AUDIOLOGY | Facility: CLINIC | Age: 80
End: 2025-06-24
Payer: MEDICARE

## 2025-06-24 DIAGNOSIS — H90.3 SENSORINEURAL HEARING LOSS (SNHL) OF BOTH EARS: Primary | ICD-10-CM

## 2025-06-24 NOTE — PROGRESS NOTES
HEARING AID CHECK    Name:  Aneta Byers  :  1945  Age:  80 y.o.    HEARING AID INFORMATION:    Right Hearing Aid  Oticon Intent 3 miniRITE-R  SN: [id number]  Warranty: 2027  Left Hearing Aid  Oticon Intent 3 miniRITE-R  SN: [id number]  Warranty: 2027    SN: [id number]  Extra : [id number]   Length   Right: 2(60)  Left: 2(85)  Dome/Earmold  Right: 5 mm open  Left: 5 mm open bass  Wax Filter  Prowax miniFit  Battery Size  Rechargeable  TLC Expiration:  2026      HISTORY:    Patient was seen for 5-month check of the above devices. She reported that she does not wear them all time but she is trying to increase wear time. She also stated that sometimes crinkling paper can be somewhat loud.    EXAM/PROCEDURES:    Visual inspection revealed minimal cerumen accumulation. Cleaned and checked hearing aids.  Vacuumed brad ports and  ports. Completed Redux dehumidification treatment for patient's hearing aids where <0.5 uL of moisture was removed during a 4:50 minute cycle. Replaced wax filters and domes. Final listening check indicated adequate sound quality and function with no distortion of internal feedback. Patient reported improvement in sound quality following clean and check.     Connected hearing aids to fitting software.  Completed firmware update.  Increased sudden sound stabilizer to maximum.  Also decreased soft gain from 9388-6969 Hz 3 steps.  Patient reported that crinkling paper no longer sounded bothersome, and overall volume of other speech was still adequate.    It was determined that patient recently got a new phone, so her hearing aids were not paired.  Hearing aids were successfully paired to her iPhone with streaming disabled, and successfully paired to her Oticon  mitzi.    RECOMMENDATIONS AND FOLLOW-UP:    - Continue use of amplification and follow-up as recommended for hearing aid maintenance and adjustments.  - Recommended 6-month  follow-up HAC. Patient to contact the office sooner if problems arise.  - Monitor and recheck hearing as warranted.  - Counseled regarding results and recommendations.      VALENTINA cD.  Audiology Student Extern    Va Hahn  Clinical Audiologist

## 2025-07-08 ENCOUNTER — OFFICE VISIT (OUTPATIENT)
Dept: PRIMARY CARE | Facility: CLINIC | Age: 80
End: 2025-07-08
Payer: MEDICARE

## 2025-07-08 VITALS — SYSTOLIC BLOOD PRESSURE: 110 MMHG | DIASTOLIC BLOOD PRESSURE: 56 MMHG | OXYGEN SATURATION: 97 % | HEART RATE: 50 BPM

## 2025-07-08 DIAGNOSIS — Z00.00 ADULT GENERAL MEDICAL EXAM: ICD-10-CM

## 2025-07-08 DIAGNOSIS — R00.1 SINUS BRADYCARDIA: ICD-10-CM

## 2025-07-08 DIAGNOSIS — I10 BENIGN ESSENTIAL HYPERTENSION: Primary | ICD-10-CM

## 2025-07-08 PROCEDURE — 3078F DIAST BP <80 MM HG: CPT | Performed by: INTERNAL MEDICINE

## 2025-07-08 PROCEDURE — 3074F SYST BP LT 130 MM HG: CPT | Performed by: INTERNAL MEDICINE

## 2025-07-08 PROCEDURE — 99213 OFFICE O/P EST LOW 20 MIN: CPT | Performed by: INTERNAL MEDICINE

## 2025-07-08 RX ORDER — AMLODIPINE BESYLATE 5 MG/1
5 TABLET ORAL DAILY
Qty: 30 TABLET | Refills: 5 | Status: SHIPPED | OUTPATIENT
Start: 2025-07-08 | End: 2026-01-04

## 2025-07-08 NOTE — PROGRESS NOTES
Subjective   Patient ID: Aneta Byers is a 80 y.o. female.    HPI  Patient presents today with concerns of low blood pressure readings and being slightly lightheaded  And checked her BP which was low   Not as good as she should be with water intake   Has also noted a little bit of swelling in her ankles that she was concerned about      She has a history of hypertension and is on amlodipine at 10 mg daily    Past medical history significant for  Anxiety and panic attacks certain heightened anxiety recently  Hypertension  Hypercholesteremia  DJD  Thyroid nodule following with endocrine  Hypothyroidism  Question of atypical migraine April 2024 versus TIA  Trigeminal/occipital neuralgia  Mild hypercalcemia mild hyperparathyroidism    Review of Systems    Objective   Physical Exam  Well-developed thin appears younger than her age no acute distress  HEENT exam is unremarkable  The lungs are clear to auscultation and percussion  Cardiovascular regular rate and rhythm the heart rate is around 50 she has known history of sinus bradycardia  Peripheries with trace edema bilaterally  Gait is normal walks without assistive device      Assessment and plan  1.  Hypertension pressure may be too well-controlled on her current dose of amlodipine also has a bit of peripheral edema therefore we are going to decrease amlodipine to 5 mg daily and recheck blood pressure in the next few weeks I believe she is due for a comprehensive examination as well  She will continue to monitor her blood pressure at home let us know if she has any recurrent symptoms of the lightheadedness  #2 sinus bradycardia longstanding history of sinus bradycardia we will continue to follow she did have a Holter monitor performed in April 2024 showing basically sinus bradycardia

## 2025-07-24 ENCOUNTER — APPOINTMENT (OUTPATIENT)
Dept: PRIMARY CARE | Facility: CLINIC | Age: 80
End: 2025-07-24
Payer: MEDICARE

## 2025-07-24 DIAGNOSIS — G45.9 TIA (TRANSIENT ISCHEMIC ATTACK): ICD-10-CM

## 2025-07-24 RX ORDER — ATORVASTATIN CALCIUM 40 MG/1
40 TABLET, FILM COATED ORAL DAILY
Qty: 90 TABLET | Refills: 3 | Status: SHIPPED | OUTPATIENT
Start: 2025-07-24

## 2025-08-11 ENCOUNTER — LAB (OUTPATIENT)
Dept: LAB | Facility: HOSPITAL | Age: 80
End: 2025-08-11
Payer: MEDICARE

## 2025-08-11 LAB
25(OH)D3 SERPL-MCNC: 59 NG/ML (ref 30–100)
ALBUMIN SERPL BCP-MCNC: 4.5 G/DL (ref 3.4–5)
ALP SERPL-CCNC: 91 U/L (ref 33–136)
ALT SERPL W P-5'-P-CCNC: 21 U/L (ref 7–45)
ANION GAP SERPL CALC-SCNC: 16 MMOL/L (ref 10–20)
APPEARANCE UR: CLEAR
AST SERPL W P-5'-P-CCNC: 20 U/L (ref 9–39)
BASOPHILS # BLD AUTO: 0.02 X10*3/UL (ref 0–0.1)
BASOPHILS NFR BLD AUTO: 0.4 %
BILIRUB SERPL-MCNC: 1.3 MG/DL (ref 0–1.2)
BILIRUB UR STRIP.AUTO-MCNC: NEGATIVE MG/DL
BUN SERPL-MCNC: 24 MG/DL (ref 6–23)
CALCIUM SERPL-MCNC: 10.1 MG/DL (ref 8.6–10.3)
CHLORIDE SERPL-SCNC: 105 MMOL/L (ref 98–107)
CHOLEST SERPL-MCNC: 175 MG/DL (ref 0–199)
CHOLESTEROL/HDL RATIO: 2.3
CO2 SERPL-SCNC: 25 MMOL/L (ref 21–32)
COLOR UR: ABNORMAL
CREAT SERPL-MCNC: 0.75 MG/DL (ref 0.5–1.05)
CRP SERPL HS-MCNC: <0.2 MG/L
EGFRCR SERPLBLD CKD-EPI 2021: 81 ML/MIN/1.73M*2
EOSINOPHIL # BLD AUTO: 0.13 X10*3/UL (ref 0–0.4)
EOSINOPHIL NFR BLD AUTO: 2.7 %
ERYTHROCYTE [DISTWIDTH] IN BLOOD BY AUTOMATED COUNT: 12 % (ref 11.5–14.5)
EST. AVERAGE GLUCOSE BLD GHB EST-MCNC: 123 MG/DL
GLUCOSE SERPL-MCNC: 98 MG/DL (ref 74–99)
GLUCOSE UR STRIP.AUTO-MCNC: NORMAL MG/DL
HBA1C MFR BLD: 5.9 % (ref ?–5.7)
HCT VFR BLD AUTO: 41.8 % (ref 36–46)
HDLC SERPL-MCNC: 76.8 MG/DL
HGB BLD-MCNC: 13.9 G/DL (ref 12–16)
IMM GRANULOCYTES # BLD AUTO: 0.01 X10*3/UL (ref 0–0.5)
IMM GRANULOCYTES NFR BLD AUTO: 0.2 % (ref 0–0.9)
KETONES UR STRIP.AUTO-MCNC: NEGATIVE MG/DL
LDLC SERPL CALC-MCNC: 80 MG/DL
LEUKOCYTE ESTERASE UR QL STRIP.AUTO: ABNORMAL
LYMPHOCYTES # BLD AUTO: 0.76 X10*3/UL (ref 0.8–3)
LYMPHOCYTES NFR BLD AUTO: 16 %
MCH RBC QN AUTO: 32.3 PG (ref 26–34)
MCHC RBC AUTO-ENTMCNC: 33.3 G/DL (ref 32–36)
MCV RBC AUTO: 97 FL (ref 80–100)
MONOCYTES # BLD AUTO: 0.36 X10*3/UL (ref 0.05–0.8)
MONOCYTES NFR BLD AUTO: 7.6 %
MUCOUS THREADS #/AREA URNS AUTO: NORMAL /LPF
NEUTROPHILS # BLD AUTO: 3.47 X10*3/UL (ref 1.6–5.5)
NEUTROPHILS NFR BLD AUTO: 73.1 %
NITRITE UR QL STRIP.AUTO: NEGATIVE
NON HDL CHOLESTEROL: 98 MG/DL (ref 0–149)
NRBC BLD-RTO: 0 /100 WBCS (ref 0–0)
PH UR STRIP.AUTO: 5.5 [PH]
PLATELET # BLD AUTO: 172 X10*3/UL (ref 150–450)
POTASSIUM SERPL-SCNC: 4.1 MMOL/L (ref 3.5–5.3)
PROT SERPL-MCNC: 6.6 G/DL (ref 6.4–8.2)
PROT UR STRIP.AUTO-MCNC: NEGATIVE MG/DL
RBC # BLD AUTO: 4.31 X10*6/UL (ref 4–5.2)
RBC # UR STRIP.AUTO: NEGATIVE MG/DL
RBC #/AREA URNS AUTO: NORMAL /HPF
SODIUM SERPL-SCNC: 142 MMOL/L (ref 136–145)
SP GR UR STRIP.AUTO: 1.02
TRIGL SERPL-MCNC: 90 MG/DL (ref 0–149)
TSH SERPL-ACNC: 1.19 MIU/L (ref 0.44–3.98)
UROBILINOGEN UR STRIP.AUTO-MCNC: NORMAL MG/DL
VLDL: 18 MG/DL (ref 0–40)
WBC # BLD AUTO: 4.8 X10*3/UL (ref 4.4–11.3)
WBC #/AREA URNS AUTO: NORMAL /HPF

## 2025-08-11 PROCEDURE — 81001 URINALYSIS AUTO W/SCOPE: CPT

## 2025-08-11 PROCEDURE — 80053 COMPREHEN METABOLIC PANEL: CPT

## 2025-08-11 PROCEDURE — 80061 LIPID PANEL: CPT

## 2025-08-11 PROCEDURE — 83036 HEMOGLOBIN GLYCOSYLATED A1C: CPT

## 2025-08-11 PROCEDURE — 87086 URINE CULTURE/COLONY COUNT: CPT

## 2025-08-11 PROCEDURE — 85025 COMPLETE CBC W/AUTO DIFF WBC: CPT

## 2025-08-11 PROCEDURE — 84443 ASSAY THYROID STIM HORMONE: CPT

## 2025-08-11 PROCEDURE — 86141 C-REACTIVE PROTEIN HS: CPT

## 2025-08-11 PROCEDURE — 82306 VITAMIN D 25 HYDROXY: CPT

## 2025-08-12 LAB — HOLD SPECIMEN: NORMAL

## 2025-08-13 ENCOUNTER — OFFICE VISIT (OUTPATIENT)
Dept: PRIMARY CARE | Facility: CLINIC | Age: 80
End: 2025-08-13
Payer: MEDICARE

## 2025-08-13 VITALS
HEART RATE: 69 BPM | SYSTOLIC BLOOD PRESSURE: 124 MMHG | BODY MASS INDEX: 20.61 KG/M2 | OXYGEN SATURATION: 98 % | DIASTOLIC BLOOD PRESSURE: 72 MMHG | WEIGHT: 112 LBS | HEIGHT: 62 IN

## 2025-08-13 VITALS
SYSTOLIC BLOOD PRESSURE: 120 MMHG | DIASTOLIC BLOOD PRESSURE: 62 MMHG | WEIGHT: 112.43 LBS | OXYGEN SATURATION: 98 % | BODY MASS INDEX: 20.69 KG/M2 | HEIGHT: 62 IN | HEART RATE: 69 BPM

## 2025-08-13 DIAGNOSIS — I10 BENIGN ESSENTIAL HYPERTENSION: Primary | ICD-10-CM

## 2025-08-13 DIAGNOSIS — E78.2 MIXED HYPERLIPIDEMIA: ICD-10-CM

## 2025-08-13 DIAGNOSIS — K59.09 CHRONIC CONSTIPATION: ICD-10-CM

## 2025-08-13 DIAGNOSIS — N32.81 OAB (OVERACTIVE BLADDER): Primary | ICD-10-CM

## 2025-08-13 DIAGNOSIS — I10 BENIGN ESSENTIAL HYPERTENSION: ICD-10-CM

## 2025-08-13 LAB — BACTERIA UR CULT: NORMAL

## 2025-08-13 PROCEDURE — 3074F SYST BP LT 130 MM HG: CPT | Performed by: INTERNAL MEDICINE

## 2025-08-13 PROCEDURE — 93000 ELECTROCARDIOGRAM COMPLETE: CPT | Performed by: INTERNAL MEDICINE

## 2025-08-13 PROCEDURE — 1159F MED LIST DOCD IN RCRD: CPT | Performed by: INTERNAL MEDICINE

## 2025-08-13 PROCEDURE — 3078F DIAST BP <80 MM HG: CPT | Performed by: INTERNAL MEDICINE

## 2025-08-13 PROCEDURE — 1036F TOBACCO NON-USER: CPT | Performed by: INTERNAL MEDICINE

## 2025-08-13 PROCEDURE — G0439 PPPS, SUBSEQ VISIT: HCPCS | Performed by: INTERNAL MEDICINE

## 2025-08-13 PROCEDURE — 1160F RVW MEDS BY RX/DR IN RCRD: CPT | Performed by: INTERNAL MEDICINE

## 2025-08-13 PROCEDURE — UHSPHYS PR UH SELECT PHYSICAL: Performed by: INTERNAL MEDICINE

## 2025-08-13 RX ORDER — MIRABEGRON 25 MG/1
25 TABLET, FILM COATED, EXTENDED RELEASE ORAL DAILY
Qty: 30 TABLET | Refills: 1 | Status: SHIPPED | OUTPATIENT
Start: 2025-08-13

## 2025-08-13 ASSESSMENT — ENCOUNTER SYMPTOMS
DEPRESSION: 1
OCCASIONAL FEELINGS OF UNSTEADINESS: 1
LOSS OF SENSATION IN FEET: 0

## 2025-08-13 ASSESSMENT — ACTIVITIES OF DAILY LIVING (ADL)
DOING_HOUSEWORK: INDEPENDENT
TAKING_MEDICATION: INDEPENDENT
GROCERY_SHOPPING: INDEPENDENT
MANAGING_FINANCES: INDEPENDENT

## 2025-08-13 ASSESSMENT — PATIENT HEALTH QUESTIONNAIRE - PHQ9
1. LITTLE INTEREST OR PLEASURE IN DOING THINGS: SEVERAL DAYS
2. FEELING DOWN, DEPRESSED OR HOPELESS: NOT AT ALL
SUM OF ALL RESPONSES TO PHQ9 QUESTIONS 1 AND 2: 1

## 2025-08-15 ENCOUNTER — APPOINTMENT (OUTPATIENT)
Dept: PRIMARY CARE | Facility: CLINIC | Age: 80
End: 2025-08-15
Payer: MEDICARE

## 2025-09-04 ENCOUNTER — TELEPHONE (OUTPATIENT)
Dept: PRIMARY CARE | Facility: CLINIC | Age: 80
End: 2025-09-04
Payer: MEDICARE

## 2025-09-06 DIAGNOSIS — N32.81 OAB (OVERACTIVE BLADDER): ICD-10-CM

## 2025-09-06 RX ORDER — MIRABEGRON 25 MG/1
TABLET, FILM COATED, EXTENDED RELEASE ORAL
Qty: 90 TABLET | Refills: 1 | Status: SHIPPED | OUTPATIENT
Start: 2025-09-06

## 2025-12-16 ENCOUNTER — APPOINTMENT (OUTPATIENT)
Dept: AUDIOLOGY | Facility: CLINIC | Age: 80
End: 2025-12-16
Payer: MEDICARE